# Patient Record
Sex: FEMALE | Race: BLACK OR AFRICAN AMERICAN | Employment: FULL TIME | URBAN - METROPOLITAN AREA
[De-identification: names, ages, dates, MRNs, and addresses within clinical notes are randomized per-mention and may not be internally consistent; named-entity substitution may affect disease eponyms.]

---

## 2018-09-04 ENCOUNTER — APPOINTMENT (OUTPATIENT)
Dept: GENERAL RADIOLOGY | Age: 48
End: 2018-09-04

## 2018-09-04 ENCOUNTER — HOSPITAL ENCOUNTER (EMERGENCY)
Age: 48
Discharge: HOME | End: 2018-09-04
Attending: EMERGENCY MEDICINE

## 2018-09-04 VITALS
DIASTOLIC BLOOD PRESSURE: 73 MMHG | OXYGEN SATURATION: 98 % | TEMPERATURE: 98.4 F | BODY MASS INDEX: 29.24 KG/M2 | SYSTOLIC BLOOD PRESSURE: 128 MMHG | HEIGHT: 62 IN | HEART RATE: 103 BPM | WEIGHT: 158.9 LBS | RESPIRATION RATE: 16 BRPM

## 2018-09-04 DIAGNOSIS — M79.602 LEFT ARM PAIN: Primary | ICD-10-CM

## 2018-09-04 PROCEDURE — 99284 EMERGENCY DEPT VISIT MOD MDM: CPT

## 2018-09-04 PROCEDURE — 73110 X-RAY EXAM OF WRIST: CPT

## 2018-09-04 PROCEDURE — 73130 X-RAY EXAM OF HAND: CPT

## 2018-09-04 PROCEDURE — 73060 X-RAY EXAM OF HUMERUS: CPT

## 2018-09-04 RX ORDER — IBUPROFEN 600 MG/1
600 TABLET ORAL EVERY 6 HOURS PRN
Qty: 30 TABLET | Refills: 0 | Status: SHIPPED | OUTPATIENT
Start: 2018-09-04 | End: 2019-04-09

## 2018-09-04 ASSESSMENT — ENCOUNTER SYMPTOMS
RHINORRHEA: 0
WHEEZING: 0
COUGH: 0
DIARRHEA: 0
CONSTIPATION: 0
SORE THROAT: 0
ABDOMINAL PAIN: 0
COLOR CHANGE: 0
VOMITING: 0
SINUS PRESSURE: 0
NAUSEA: 0
SHORTNESS OF BREATH: 0

## 2018-09-04 ASSESSMENT — PAIN DESCRIPTION - ORIENTATION: ORIENTATION: LEFT

## 2018-09-04 ASSESSMENT — PAIN DESCRIPTION - FREQUENCY: FREQUENCY: CONTINUOUS

## 2018-09-04 ASSESSMENT — PAIN SCALES - WONG BAKER: WONGBAKER_NUMERICALRESPONSE: 8

## 2018-09-04 ASSESSMENT — PAIN DESCRIPTION - DESCRIPTORS: DESCRIPTORS: ACHING;CONSTANT

## 2018-09-04 ASSESSMENT — PAIN DESCRIPTION - LOCATION: LOCATION: BACK;ARM;HAND

## 2018-09-04 ASSESSMENT — PAIN SCALES - GENERAL: PAINLEVEL_OUTOF10: 8
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
## 2018-09-04 NOTE — ED PROVIDER NOTES
The patient was seen and examined by me in conjunction with the mid-level provider. I agree with his/her assessment and treatment plan. X-rays are negative per radiologist.  She will be treated symptomatically.      Aliya Barajas MD  09/04/18 [id number]

## 2019-04-09 ENCOUNTER — HOSPITAL ENCOUNTER (INPATIENT)
Age: 49
LOS: 1 days | Discharge: HOME | DRG: 371 | End: 2019-04-10
Attending: EMERGENCY MEDICINE | Admitting: INTERNAL MEDICINE
Payer: COMMERCIAL

## 2019-04-09 ENCOUNTER — APPOINTMENT (OUTPATIENT)
Dept: CT IMAGING | Age: 49
DRG: 371 | End: 2019-04-09
Payer: COMMERCIAL

## 2019-04-09 DIAGNOSIS — N28.9 MILD RENAL INSUFFICIENCY: ICD-10-CM

## 2019-04-09 DIAGNOSIS — E86.0 DEHYDRATION: ICD-10-CM

## 2019-04-09 DIAGNOSIS — R11.2 NAUSEA AND VOMITING, INTRACTABILITY OF VOMITING NOT SPECIFIED, UNSPECIFIED VOMITING TYPE: ICD-10-CM

## 2019-04-09 DIAGNOSIS — R19.7 DIARRHEA, UNSPECIFIED TYPE: Primary | ICD-10-CM

## 2019-04-09 DIAGNOSIS — I95.9 HYPOTENSION, UNSPECIFIED HYPOTENSION TYPE: ICD-10-CM

## 2019-04-09 PROBLEM — I10 ESSENTIAL HYPERTENSION: Status: ACTIVE | Noted: 2019-04-09

## 2019-04-09 PROBLEM — A04.72 CLOSTRIDIUM DIFFICILE COLITIS: Status: ACTIVE | Noted: 2019-04-09

## 2019-04-09 PROBLEM — D57.3 SICKLE CELL TRAIT (HCC): Status: ACTIVE | Noted: 2019-04-09

## 2019-04-09 PROBLEM — R57.1 HYPOVOLEMIC SHOCK (HCC): Status: ACTIVE | Noted: 2019-04-09

## 2019-04-09 PROBLEM — K52.9 COLITIS: Status: ACTIVE | Noted: 2019-04-09

## 2019-04-09 PROBLEM — N30.00 ACUTE CYSTITIS WITHOUT HEMATURIA: Status: ACTIVE | Noted: 2019-04-09

## 2019-04-09 PROBLEM — A09 INFECTIOUS COLITIS: Status: ACTIVE | Noted: 2019-04-09

## 2019-04-09 PROBLEM — N17.9 AKI (ACUTE KIDNEY INJURY) (HCC): Status: ACTIVE | Noted: 2019-04-09

## 2019-04-09 LAB
-: ABNORMAL
ABSOLUTE EOS #: 0.08 K/UL (ref 0–0.4)
ABSOLUTE IMMATURE GRANULOCYTE: ABNORMAL K/UL (ref 0–0.3)
ABSOLUTE LYMPH #: 1.73 K/UL (ref 1–4.8)
ABSOLUTE MONO #: 0.68 K/UL (ref 0.2–0.8)
ALBUMIN SERPL-MCNC: 3.8 G/DL (ref 3.5–5.2)
ALBUMIN/GLOBULIN RATIO: ABNORMAL (ref 1–2.5)
ALP BLD-CCNC: 103 U/L (ref 35–104)
ALT SERPL-CCNC: 38 U/L (ref 5–33)
AMORPHOUS: ABNORMAL
AMYLASE: 77 U/L (ref 28–100)
ANION GAP SERPL CALCULATED.3IONS-SCNC: 12 MMOL/L (ref 9–17)
AST SERPL-CCNC: 36 U/L
BACTERIA: ABNORMAL
BASOPHILS # BLD: 0 %
BASOPHILS ABSOLUTE: 0 K/UL (ref 0–0.2)
BILIRUB SERPL-MCNC: 1.22 MG/DL (ref 0.3–1.2)
BILIRUBIN DIRECT: 0.42 MG/DL
BILIRUBIN URINE: NEGATIVE
BILIRUBIN, INDIRECT: 0.8 MG/DL (ref 0–1)
BUN BLDV-MCNC: 13 MG/DL (ref 6–20)
BUN/CREAT BLD: 10 (ref 9–20)
C DIFFICILE TOXINS, PCR: ABNORMAL
CALCIUM SERPL-MCNC: 9 MG/DL (ref 8.6–10.4)
CAMPYLOBACTER PCR: NORMAL
CASTS UA: ABNORMAL /LPF
CHLORIDE BLD-SCNC: 98 MMOL/L (ref 98–107)
CO2: 23 MMOL/L (ref 20–31)
COLOR: YELLOW
COMMENT UA: ABNORMAL
CREAT SERPL-MCNC: 1.25 MG/DL (ref 0.5–0.9)
CRYSTALS, UA: ABNORMAL /HPF
DATE, STOOL #1: NORMAL
DATE, STOOL #2: NORMAL
DATE, STOOL #3: NORMAL
DIFFERENTIAL TYPE: ABNORMAL
DIRECT EXAM: NORMAL
DIRECT EXAM: NORMAL
E COLI ENTEROTOXIGENIC PCR: NORMAL
EOSINOPHILS RELATIVE PERCENT: 1 % (ref 1–4)
EPITHELIAL CELLS UA: ABNORMAL /HPF (ref 0–5)
GFR AFRICAN AMERICAN: 55 ML/MIN
GFR NON-AFRICAN AMERICAN: 46 ML/MIN
GFR SERPL CREATININE-BSD FRML MDRD: ABNORMAL ML/MIN/{1.73_M2}
GFR SERPL CREATININE-BSD FRML MDRD: ABNORMAL ML/MIN/{1.73_M2}
GLOBULIN: ABNORMAL G/DL (ref 1.5–3.8)
GLUCOSE BLD-MCNC: 119 MG/DL (ref 70–99)
GLUCOSE URINE: NEGATIVE
HCG, PREGNANCY URINE (POC): NEGATIVE
HCT VFR BLD CALC: 34 % (ref 36–46)
HEMOCCULT SP1 STL QL: NEGATIVE
HEMOCCULT SP2 STL QL: NORMAL
HEMOCCULT SP3 STL QL: NORMAL
HEMOGLOBIN: 10.5 G/DL (ref 12–16)
IMMATURE GRANULOCYTES: ABNORMAL %
KETONES, URINE: NEGATIVE
LACTIC ACID: 0.8 MMOL/L (ref 0.5–2.2)
LEUKOCYTE ESTERASE, URINE: ABNORMAL
LIPASE: 14 U/L (ref 13–60)
LYMPHOCYTES # BLD: 23 % (ref 24–44)
Lab: NORMAL
Lab: NORMAL
MAGNESIUM: 1.7 MG/DL (ref 1.6–2.6)
MCH RBC QN AUTO: 19.2 PG (ref 26–34)
MCHC RBC AUTO-ENTMCNC: 31 G/DL (ref 31–37)
MCV RBC AUTO: 62 FL (ref 80–100)
MONOCYTES # BLD: 9 % (ref 1–7)
MORPHOLOGY: ABNORMAL
MUCUS: ABNORMAL
NITRITE, URINE: NEGATIVE
NRBC AUTOMATED: ABNORMAL PER 100 WBC
OTHER OBSERVATIONS UA: ABNORMAL
PDW BLD-RTO: 17.8 % (ref 11.5–14.5)
PH UA: 6 (ref 5–8)
PLATELET # BLD: 143 K/UL (ref 130–400)
PLATELET ESTIMATE: ABNORMAL
PLESIOMONAS SHIGELLOIDES PCR: NORMAL
PMV BLD AUTO: 9 FL (ref 6–12)
POTASSIUM SERPL-SCNC: 3.8 MMOL/L (ref 3.7–5.3)
PROTEIN UA: NEGATIVE
RBC # BLD: 5.48 M/UL (ref 4–5.2)
RBC # BLD: ABNORMAL 10*6/UL
RBC UA: ABNORMAL /HPF (ref 0–2)
RENAL EPITHELIAL, UA: ABNORMAL /HPF
SALMONELLA PCR: NORMAL
SEG NEUTROPHILS: 67 % (ref 36–66)
SEGMENTED NEUTROPHILS ABSOLUTE COUNT: 5.01 K/UL (ref 1.8–7.7)
SHIGATOXIN GENE PCR: NORMAL
SHIGELLA SP PCR: NORMAL
SODIUM BLD-SCNC: 133 MMOL/L (ref 135–144)
SPECIFIC GRAVITY UA: ABNORMAL (ref 1–1.03)
SPECIMEN DESCRIPTION: ABNORMAL
SPECIMEN DESCRIPTION: NORMAL
TIME, STOOL #1: 635
TIME, STOOL #2: NORMAL
TIME, STOOL #3: NORMAL
TOTAL PROTEIN: 7 G/DL (ref 6.4–8.3)
TRICHOMONAS: ABNORMAL
TURBIDITY: ABNORMAL
URINE HGB: ABNORMAL
UROBILINOGEN, URINE: NORMAL
VIBRIO PCR: NORMAL
WBC # BLD: 7.5 K/UL (ref 3.5–11)
WBC # BLD: ABNORMAL 10*3/UL
WBC UA: ABNORMAL /HPF (ref 0–5)
YEAST: ABNORMAL
YERSINIA ENTEROCOLITICA PCR: NORMAL

## 2019-04-09 PROCEDURE — 84703 CHORIONIC GONADOTROPIN ASSAY: CPT

## 2019-04-09 PROCEDURE — 80076 HEPATIC FUNCTION PANEL: CPT

## 2019-04-09 PROCEDURE — 81001 URINALYSIS AUTO W/SCOPE: CPT

## 2019-04-09 PROCEDURE — 6370000000 HC RX 637 (ALT 250 FOR IP): Performed by: INTERNAL MEDICINE

## 2019-04-09 PROCEDURE — 2500000003 HC RX 250 WO HCPCS: Performed by: INTERNAL MEDICINE

## 2019-04-09 PROCEDURE — 6370000000 HC RX 637 (ALT 250 FOR IP): Performed by: EMERGENCY MEDICINE

## 2019-04-09 PROCEDURE — 2580000003 HC RX 258: Performed by: EMERGENCY MEDICINE

## 2019-04-09 PROCEDURE — 82272 OCCULT BLD FECES 1-3 TESTS: CPT

## 2019-04-09 PROCEDURE — 83735 ASSAY OF MAGNESIUM: CPT

## 2019-04-09 PROCEDURE — 80307 DRUG TEST PRSMV CHEM ANLYZR: CPT

## 2019-04-09 PROCEDURE — 96361 HYDRATE IV INFUSION ADD-ON: CPT

## 2019-04-09 PROCEDURE — 6360000002 HC RX W HCPCS: Performed by: INTERNAL MEDICINE

## 2019-04-09 PROCEDURE — 6360000002 HC RX W HCPCS: Performed by: EMERGENCY MEDICINE

## 2019-04-09 PROCEDURE — 87040 BLOOD CULTURE FOR BACTERIA: CPT

## 2019-04-09 PROCEDURE — 87425 ROTAVIRUS AG IA: CPT

## 2019-04-09 PROCEDURE — 36415 COLL VENOUS BLD VENIPUNCTURE: CPT

## 2019-04-09 PROCEDURE — 87086 URINE CULTURE/COLONY COUNT: CPT

## 2019-04-09 PROCEDURE — 99223 1ST HOSP IP/OBS HIGH 75: CPT | Performed by: INTERNAL MEDICINE

## 2019-04-09 PROCEDURE — 85025 COMPLETE CBC W/AUTO DIFF WBC: CPT

## 2019-04-09 PROCEDURE — 6360000004 HC RX CONTRAST MEDICATION: Performed by: EMERGENCY MEDICINE

## 2019-04-09 PROCEDURE — 2060000000 HC ICU INTERMEDIATE R&B

## 2019-04-09 PROCEDURE — 83690 ASSAY OF LIPASE: CPT

## 2019-04-09 PROCEDURE — 87329 GIARDIA AG IA: CPT

## 2019-04-09 PROCEDURE — 2580000003 HC RX 258: Performed by: INTERNAL MEDICINE

## 2019-04-09 PROCEDURE — 87506 IADNA-DNA/RNA PROBE TQ 6-11: CPT

## 2019-04-09 PROCEDURE — C9113 INJ PANTOPRAZOLE SODIUM, VIA: HCPCS | Performed by: EMERGENCY MEDICINE

## 2019-04-09 PROCEDURE — 87493 C DIFF AMPLIFIED PROBE: CPT

## 2019-04-09 PROCEDURE — 82150 ASSAY OF AMYLASE: CPT

## 2019-04-09 PROCEDURE — 96375 TX/PRO/DX INJ NEW DRUG ADDON: CPT

## 2019-04-09 PROCEDURE — 80048 BASIC METABOLIC PNL TOTAL CA: CPT

## 2019-04-09 PROCEDURE — G0480 DRUG TEST DEF 1-7 CLASSES: HCPCS

## 2019-04-09 PROCEDURE — 83605 ASSAY OF LACTIC ACID: CPT

## 2019-04-09 PROCEDURE — 87077 CULTURE AEROBIC IDENTIFY: CPT

## 2019-04-09 PROCEDURE — 74177 CT ABD & PELVIS W/CONTRAST: CPT

## 2019-04-09 PROCEDURE — 87186 SC STD MICRODIL/AGAR DIL: CPT

## 2019-04-09 PROCEDURE — 96374 THER/PROPH/DIAG INJ IV PUSH: CPT

## 2019-04-09 PROCEDURE — 99285 EMERGENCY DEPT VISIT HI MDM: CPT

## 2019-04-09 RX ORDER — SODIUM CHLORIDE 9 MG/ML
INJECTION, SOLUTION INTRAVENOUS CONTINUOUS
Status: DISCONTINUED | OUTPATIENT
Start: 2019-04-09 | End: 2019-04-09

## 2019-04-09 RX ORDER — SODIUM CHLORIDE 0.9 % (FLUSH) 0.9 %
10 SYRINGE (ML) INJECTION PRN
Status: DISCONTINUED | OUTPATIENT
Start: 2019-04-09 | End: 2019-04-09 | Stop reason: SDUPTHER

## 2019-04-09 RX ORDER — ONDANSETRON 2 MG/ML
8 INJECTION INTRAMUSCULAR; INTRAVENOUS ONCE
Status: COMPLETED | OUTPATIENT
Start: 2019-04-09 | End: 2019-04-09

## 2019-04-09 RX ORDER — 0.9 % SODIUM CHLORIDE 0.9 %
10 VIAL (ML) INJECTION ONCE
Status: COMPLETED | OUTPATIENT
Start: 2019-04-09 | End: 2019-04-09

## 2019-04-09 RX ORDER — ACETAMINOPHEN 325 MG/1
650 TABLET ORAL EVERY 4 HOURS PRN
Status: DISCONTINUED | OUTPATIENT
Start: 2019-04-09 | End: 2019-04-10 | Stop reason: HOSPADM

## 2019-04-09 RX ORDER — PANTOPRAZOLE SODIUM 40 MG/10ML
40 INJECTION, POWDER, LYOPHILIZED, FOR SOLUTION INTRAVENOUS ONCE
Status: COMPLETED | OUTPATIENT
Start: 2019-04-09 | End: 2019-04-09

## 2019-04-09 RX ORDER — DICYCLOMINE HYDROCHLORIDE 10 MG/1
10 CAPSULE ORAL
Status: DISCONTINUED | OUTPATIENT
Start: 2019-04-09 | End: 2019-04-09

## 2019-04-09 RX ORDER — FENTANYL CITRATE 50 UG/ML
50 INJECTION, SOLUTION INTRAMUSCULAR; INTRAVENOUS ONCE
Status: COMPLETED | OUTPATIENT
Start: 2019-04-09 | End: 2019-04-09

## 2019-04-09 RX ORDER — 0.9 % SODIUM CHLORIDE 0.9 %
1000 INTRAVENOUS SOLUTION INTRAVENOUS ONCE
Status: COMPLETED | OUTPATIENT
Start: 2019-04-09 | End: 2019-04-09

## 2019-04-09 RX ORDER — 0.9 % SODIUM CHLORIDE 0.9 %
30 INTRAVENOUS SOLUTION INTRAVENOUS ONCE
Status: COMPLETED | OUTPATIENT
Start: 2019-04-09 | End: 2019-04-09

## 2019-04-09 RX ORDER — ZONISAMIDE 100 MG/1
200 CAPSULE ORAL NIGHTLY
Status: DISCONTINUED | OUTPATIENT
Start: 2019-04-09 | End: 2019-04-10 | Stop reason: HOSPADM

## 2019-04-09 RX ORDER — SODIUM CHLORIDE 9 MG/ML
INJECTION, SOLUTION INTRAVENOUS CONTINUOUS
Status: DISCONTINUED | OUTPATIENT
Start: 2019-04-09 | End: 2019-04-10

## 2019-04-09 RX ORDER — DICYCLOMINE HYDROCHLORIDE 10 MG/1
10 CAPSULE ORAL 4 TIMES DAILY
Status: DISCONTINUED | OUTPATIENT
Start: 2019-04-09 | End: 2019-04-10 | Stop reason: HOSPADM

## 2019-04-09 RX ORDER — FOLIC ACID 1 MG/1
1 TABLET ORAL DAILY
Status: DISCONTINUED | OUTPATIENT
Start: 2019-04-09 | End: 2019-04-10 | Stop reason: HOSPADM

## 2019-04-09 RX ORDER — SODIUM CHLORIDE 0.9 % (FLUSH) 0.9 %
10 SYRINGE (ML) INJECTION PRN
Status: DISCONTINUED | OUTPATIENT
Start: 2019-04-09 | End: 2019-04-10 | Stop reason: HOSPADM

## 2019-04-09 RX ORDER — TIZANIDINE 2 MG/1
2 TABLET ORAL EVERY 8 HOURS PRN
Status: DISCONTINUED | OUTPATIENT
Start: 2019-04-09 | End: 2019-04-10 | Stop reason: HOSPADM

## 2019-04-09 RX ORDER — ACETAMINOPHEN 500 MG
1000 TABLET ORAL ONCE
Status: COMPLETED | OUTPATIENT
Start: 2019-04-09 | End: 2019-04-09

## 2019-04-09 RX ORDER — LIDOCAINE 4 G/G
1 PATCH TOPICAL DAILY
Status: DISCONTINUED | OUTPATIENT
Start: 2019-04-10 | End: 2019-04-10

## 2019-04-09 RX ORDER — 0.9 % SODIUM CHLORIDE 0.9 %
80 INTRAVENOUS SOLUTION INTRAVENOUS ONCE
Status: COMPLETED | OUTPATIENT
Start: 2019-04-09 | End: 2019-04-09

## 2019-04-09 RX ORDER — CHLORZOXAZONE 750 MG/1
750 TABLET ORAL 3 TIMES DAILY
COMMUNITY

## 2019-04-09 RX ORDER — CALCIUM CARBONATE 200(500)MG
500 TABLET,CHEWABLE ORAL 3 TIMES DAILY PRN
Status: DISCONTINUED | OUTPATIENT
Start: 2019-04-09 | End: 2019-04-10 | Stop reason: HOSPADM

## 2019-04-09 RX ORDER — SODIUM CHLORIDE 0.9 % (FLUSH) 0.9 %
10 SYRINGE (ML) INJECTION EVERY 12 HOURS SCHEDULED
Status: DISCONTINUED | OUTPATIENT
Start: 2019-04-09 | End: 2019-04-10 | Stop reason: HOSPADM

## 2019-04-09 RX ORDER — OXYCODONE AND ACETAMINOPHEN 10; 325 MG/1; MG/1
1 TABLET ORAL EVERY 6 HOURS PRN
Status: DISCONTINUED | OUTPATIENT
Start: 2019-04-09 | End: 2019-04-10 | Stop reason: HOSPADM

## 2019-04-09 RX ORDER — ZONISAMIDE 100 MG/1
200 CAPSULE ORAL NIGHTLY
COMMUNITY
End: 2020-02-02 | Stop reason: ALTCHOICE

## 2019-04-09 RX ADMIN — Medication 10 ML: at 05:36

## 2019-04-09 RX ADMIN — IBUPROFEN 600 MG: 100 SUSPENSION ORAL at 06:16

## 2019-04-09 RX ADMIN — SODIUM CHLORIDE 80 ML: 9 INJECTION, SOLUTION INTRAVENOUS at 05:36

## 2019-04-09 RX ADMIN — ACETAMINOPHEN 1000 MG: 500 TABLET ORAL at 06:16

## 2019-04-09 RX ADMIN — METRONIDAZOLE 500 MG: 500 INJECTION, SOLUTION INTRAVENOUS at 22:32

## 2019-04-09 RX ADMIN — SODIUM CHLORIDE 2190 ML: 9 INJECTION, SOLUTION INTRAVENOUS at 05:55

## 2019-04-09 RX ADMIN — SODIUM CHLORIDE 1000 ML: 9 INJECTION, SOLUTION INTRAVENOUS at 07:49

## 2019-04-09 RX ADMIN — IOPAMIDOL 75 ML: 755 INJECTION, SOLUTION INTRAVENOUS at 05:33

## 2019-04-09 RX ADMIN — FOLIC ACID 1 MG: 1 TABLET ORAL at 16:17

## 2019-04-09 RX ADMIN — SODIUM CHLORIDE 1000 ML: 9 INJECTION, SOLUTION INTRAVENOUS at 09:13

## 2019-04-09 RX ADMIN — SODIUM CHLORIDE 150 ML/HR: 9 INJECTION, SOLUTION INTRAVENOUS at 11:33

## 2019-04-09 RX ADMIN — ACETAMINOPHEN 650 MG: 325 TABLET ORAL at 16:17

## 2019-04-09 RX ADMIN — ONDANSETRON 8 MG: 2 INJECTION INTRAMUSCULAR; INTRAVENOUS at 05:23

## 2019-04-09 RX ADMIN — Medication 125 MG: at 16:18

## 2019-04-09 RX ADMIN — CEFEPIME 2 G: 2 INJECTION, POWDER, FOR SOLUTION INTRAVENOUS at 11:30

## 2019-04-09 RX ADMIN — Medication 10 ML: at 05:24

## 2019-04-09 RX ADMIN — OXYCODONE AND ACETAMINOPHEN 1 TABLET: 10; 325 TABLET ORAL at 22:59

## 2019-04-09 RX ADMIN — METRONIDAZOLE 500 MG: 500 INJECTION, SOLUTION INTRAVENOUS at 12:27

## 2019-04-09 RX ADMIN — PANTOPRAZOLE SODIUM 40 MG: 40 INJECTION, POWDER, FOR SOLUTION INTRAVENOUS at 05:24

## 2019-04-09 RX ADMIN — ENOXAPARIN SODIUM 40 MG: 40 INJECTION SUBCUTANEOUS at 11:31

## 2019-04-09 RX ADMIN — FENTANYL CITRATE 50 MCG: 50 INJECTION, SOLUTION INTRAMUSCULAR; INTRAVENOUS at 05:23

## 2019-04-09 ASSESSMENT — ENCOUNTER SYMPTOMS
EYES NEGATIVE: 1
ABDOMINAL PAIN: 1
RESPIRATORY NEGATIVE: 1
NAUSEA: 1
VOMITING: 1
ABDOMINAL DISTENTION: 1
DIARRHEA: 1

## 2019-04-09 ASSESSMENT — PAIN SCALES - GENERAL
PAINLEVEL_OUTOF10: 8
PAINLEVEL_OUTOF10: 7
PAINLEVEL_OUTOF10: 7

## 2019-04-09 ASSESSMENT — PAIN DESCRIPTION - DESCRIPTORS: DESCRIPTORS: ACHING

## 2019-04-09 ASSESSMENT — PAIN DESCRIPTION - PAIN TYPE: TYPE: ACUTE PAIN

## 2019-04-09 ASSESSMENT — PAIN DESCRIPTION - FREQUENCY: FREQUENCY: CONTINUOUS

## 2019-04-09 ASSESSMENT — PAIN DESCRIPTION - PROGRESSION: CLINICAL_PROGRESSION: NOT CHANGED

## 2019-04-09 ASSESSMENT — PAIN DESCRIPTION - LOCATION: LOCATION: ABDOMEN;GENERALIZED

## 2019-04-09 NOTE — PROGRESS NOTES
Patient return to room 1024 from PACU. Telemetry placed and call light given/within reach. Patient A&O and given room orientation. Orders released/reviewed. Message sent to pharmacy to send new bicarb bag. See chart for detail.

## 2019-04-09 NOTE — PROGRESS NOTES
Patient admitted to room 1023. VS taken, telemetry placed and call light given/within reach. Patient A&O and given room orientation. Orders released/reviewed, initial assessment completed and navigator started. See chart for more detail.

## 2019-04-09 NOTE — PROGRESS NOTES
Transitions of Care Pharmacy Service   Medication Review    The patient's list of current home medications has been reviewed and updated. Source(s) of information: patient, Emmie, [employer]  She gets her pain meds from [employer], all other prescriptions are filled at [facility]    Please feel free to call with any questions about this encounter. Thank you.     Manjula Luna, [facility]  Transitions of Care Pharmacy Service  Phone:  [phone]  Fax: [phone]            Prior to Admission medications    Medication Sig       chlorzoxazone (PARAFON FORTE) 750 MG TABS tablet Take 750 mg by mouth 3 times daily        calcium-cholecalciferol (CALCIUM 500+D) 500-200 MG-UNIT per tablet Take 1 tablet by mouth daily       Lidocaine (ZTLIDO) 1.8 % PTCH Apply 1 patch topically daily To leg       zonisamide (ZONEGRAN) 100 MG capsule Take 200 mg by mouth nightly 2 caps (=200mg) nightly       progesterone (PROMETRIUM) 100 MG capsule Take 100 mg by mouth daily       estrogens, conjugated, (PREMARIN) 0.3 MG tablet Take 0.3 mg by mouth daily       Cyanocobalamin (VITAMIN B12 PO) Take 1 tablet by mouth daily       FOLIC ACID PO Take 1 tablet by mouth daily       lisinopril (PRINIVIL;ZESTRIL) 5 MG tablet Take 7.5 mg by mouth daily 1.5 tabs (=7.5mg) daily       oxyCODONE-acetaminophen (PERCOCET)  MG per tablet Take 1 tablet by mouth every 6 hours as needed for Pain.        loratadine (CLARITIN) 10 MG tablet Take 1 tablet by mouth daily as needed

## 2019-04-09 NOTE — ED NOTES
Into assess patient vitals continue to fluctuate running on the lower side of 19'V systolic to high 60'Y. Patient verbalizing \"feels weak\".      Juanita Engel RN  04/09/19 0036

## 2019-04-09 NOTE — ED PROVIDER NOTES
97/56 113/69   Pulse:   87 84   Resp:   16 14   Temp:   98.1 °F (36.7 °C) 98.4 °F (36.9 °C)   TempSrc:   Oral    SpO2: 96% 99% 100% 99%   Weight:       Height:           Physical exam reflects a hypotensive female who does appear uncomfortable. She is tachycardic. Low-grade temp noted. Integument warm and dry. She is alert conversive and appropriate in behavior. GCS 15. Oropharyngeal exam without lesion. Dry membranes noted. No cervical lymphadenopathy noted. Neck soft and supple no meningismus. Heart regular rate and rhythm normal S1 and S2 tachycardic lungs are clear to auscultation no wheezes rales rhonchi abdomen is distended minimal bowel sounds noted. She is diffusely tender. Extremities show no cyanosis clubbing or edema. Integument without rash or lesion. No neurovascular deficits are noted      DIAGNOSTIC RESULTS       RADIOLOGY:   Non-plain film images such as CT, Ultrasound and MRI are read by the radiologist. Plain radiographic images are visualized and preliminarily interpreted by the emergency physician with the below findings:    CT ABDOMEN PELVIS W IV CONTRAST   Status: Final result   Order Providers     Authorizing Billing   MD Joann Thorpe, DO          Signed by     Signed Date/Time  Phone Pager   [address] [place] [address] [phone] [address] 4/09/2019 06:16 [phone]    [facility]     Read Date Phone Pager   Gilmer Castillo Apr 9, 2019 [phone]    Radiation Dose Estimates     No radiation information found for this patient   Narrative   EXAMINATION:   CT OF THE ABDOMEN AND PELVIS WITH CONTRAST 4/9/2019 5:17 am       TECHNIQUE:   CT of the abdomen and pelvis was performed with the administration of   intravenous contrast. Multiplanar reformatted images are provided for review. Dose modulation, iterative reconstruction, and/or weight based adjustment of   the mA/kV was utilized to reduce the radiation dose to as low as reasonably   achievable.       COMPARISON:   None.     HISTORY:   ORDERING SYSTEM PROVIDED HISTORY: ABDOMINAL PAIN   TECHNOLOGIST PROVIDED HISTORY:   IV Only Contrast       FINDINGS:   Lower Chest: Trace bilateral pleural effusions.  Mild bibasilar subsegmental   atelectasis.  No cardiomegaly or pericardial effusion.       Liver: Normal.       Gallbladder and Bile Ducts: Normal.       Spleen: Normal.       Adrenal Glands: Normal.       Pancreas: Normal.       Genitourinary: Normal.       Bowel: Diffuse colonic wall thickening and inflammation is most pronounced in   the rectosigmoid colon.  There is also fluid in the descending and   rectosigmoid colon.  No bowel obstruction.  Normal appendix.       Vasculature: Normal.       Bones and Soft Tissues: No acute abnormality.       Retroperitoneum/Mesentery: No intraperitoneal free air, ascites or fluid   collection. No lymphadenopathy in the abdomen or pelvis.           Impression   Diffuse colonic wall thickening and inflammation is most pronounced in the   rectosigmoid colon.  There is also fluid in the descending and rectosigmoid   colon.  Findings suggest infectious versus inflammatory colitis.       No free air or abscess.       Trace bilateral pleural effusions.             Interpretation per the Radiologist below, if available at the time of this note:    CT ABDOMEN PELVIS W IV CONTRAST   Final Result   Diffuse colonic wall thickening and inflammation is most pronounced in the   rectosigmoid colon. There is also fluid in the descending and rectosigmoid   colon. Findings suggest infectious versus inflammatory colitis. No free air or abscess. Trace bilateral pleural effusions. LABS:  Labs Reviewed   C DIFF TOXIN B BY RT PCR - Abnormal; Notable for the following components:       Result Value    C Difficile tox, pcr   (*)     Value: POSITIVE: C difficile tcdB nucleic acid detected by RT-PCR.     All other components within normal limits   BASIC METABOLIC PANEL - Abnormal; Notable for the following components:    Glucose 119 (*)     CREATININE 1.25 (*)     Sodium 133 (*)     GFR Non- 46 (*)     GFR  55 (*)     All other components within normal limits   CBC WITH AUTO DIFFERENTIAL - Abnormal; Notable for the following components:    RBC 5.48 (*)     Hemoglobin 10.5 (*)     Hematocrit 34.0 (*)     MCV 62.0 (*)     MCH 19.2 (*)     RDW 17.8 (*)     Seg Neutrophils 67 (*)     Lymphocytes 23 (*)     Monocytes 9 (*)     All other components within normal limits   HEPATIC FUNCTION PANEL - Abnormal; Notable for the following components:    ALT 38 (*)     AST 36 (*)     Total Bilirubin 1.22 (*)     Bilirubin, Direct 0.42 (*)     All other components within normal limits   URINALYSIS - Abnormal; Notable for the following components:    Turbidity UA SLIGHTLY CLOUDY (*)     Urine Hgb 2+ (*)     Leukocyte Esterase, Urine TRACE (*)     All other components within normal limits   TOX SCR, BLD, ED - Abnormal; Notable for the following components:    Salicylate Lvl <1 (*)     Acetaminophen Level <10 (*)     All other components within normal limits   MICROSCOPIC URINALYSIS - Abnormal; Notable for the following components:    Bacteria, UA MANY (*)     All other components within normal limits   CULTURE BLOOD #1   CULTURE BLOOD #1   CULTURE STOOL   GIARDIA ANTIGEN   URINE CULTURE   AMYLASE   LIPASE   LACTIC ACID   MAGNESIUM   BLOOD OCCULT STOOL SCREEN #1   ROTAVIRUS ANTIGEN, STOOL   COMPREHENSIVE METABOLIC PANEL W/ REFLEX TO MG FOR LOW K   CBC   PROTIME-INR   CALCIUM, IONIZED   MAGNESIUM   PHOSPHORUS   POCT URINE PREGNANCY     Results for Shruti Fabian (MRN [id number]) as of 4/9/2019 06:16   Ref.  Range 4/9/2019 05:15 4/9/2019 05:24   Sodium Latest Ref Range: 135 - 144 mmol/L 133 (L)    Potassium Latest Ref Range: 3.7 - 5.3 mmol/L 3.8    Chloride Latest Ref Range: 98 - 107 mmol/L 98    CO2 Latest Ref Range: 20 - 31 mmol/L 23    BUN Latest Ref Range: 6 - 20 mg/dL 13    Creatinine Latest Ref Range: 0.50 - 0.90 mg/dL 1.25 (H)    Bun/Cre Ratio Latest Ref Range: 9 - 20  10    Anion Gap Latest Ref Range: 9 - 17 mmol/L 12    GFR Non- Latest Ref Range: >60 mL/min 46 (L)    GFR  Latest Ref Range: >60 mL/min 55 (L)    Magnesium Latest Ref Range: 1.6 - 2.6 mg/dL 1.7    Lactic Acid Latest Ref Range: 0.5 - 2.2 mmol/L 0.8    Glucose Latest Ref Range: 70 - 99 mg/dL 119 (H)    Calcium Latest Ref Range: 8.6 - 10.4 mg/dL 9.0    Albumin/Globulin Ratio Latest Ref Range: 1.0 - 2.5  NOT REPORTED    Total Protein Latest Ref Range: 6.4 - 8.3 g/dL 7.0    GFR Comment Unknown Pend    GFR Staging Unknown NOT REPORTED    Albumin Latest Ref Range: 3.5 - 5.2 g/dL 3.8    Globulin Latest Ref Range: 1.5 - 3.8 g/dL NOT REPORTED    Alk Phos Latest Ref Range: 35 - 104 U/L 103    ALT Latest Ref Range: 5 - 33 U/L 38 (H)    Amylase Latest Ref Range: 28 - 100 U/L 77    AST Latest Ref Range: <32 U/L 36 (H)    Bilirubin Latest Ref Range: 0.3 - 1.2 mg/dL 1.22 (H)    Bilirubin, Direct Latest Ref Range: <0.31 mg/dL 0.42 (H)    Bilirubin, Indirect Latest Ref Range: 0.00 - 1.00 mg/dL 0.80    Lipase Latest Ref Range: 13 - 60 U/L 14    Toxic Tricyclic Sc,Blood Latest Ref Range: NEGATIVE  PENDING    Ethanol Latest Ref Range: <10 mg/dL <10    Ethanol percent Latest Ref Range: <0.010 % <0.010    Acetaminophen Level Latest Ref Range: 10 - 30 ug/mL <53 (L)    Salicylate Lvl Latest Ref Range: 3 - 10 mg/dL <1 (L)    WBC Latest Ref Range: 3.5 - 11.0 k/uL 7.5    RBC Latest Ref Range: 4.0 - 5.2 m/uL 5.48 (H)    Hemoglobin Quant Latest Ref Range: 12.0 - 16.0 g/dL 10.5 (L)    Hematocrit Latest Ref Range: 36 - 46 % 34.0 (L)    MCV Latest Ref Range: 80 - 100 fL 62.0 (L)    MCH Latest Ref Range: 26 - 34 pg 19.2 (L)    MCHC Latest Ref Range: 31 - 37 g/dL 31.0    MPV Latest Ref Range: 6.0 - 12.0 fL 9.0    RDW Latest Ref Range: 11.5 - 14.5 % 17.8 (H)    Platelet Count Latest Ref Range: 130 - 400 k/uL 143    Platelet Estimate Unknown NOT REPORTED      All other labs were within normal range or not returned as of this dictation. EMERGENCY DEPARTMENT COURSE and DIFFERENTIAL DIAGNOSIS/MDM:   Vitals:    Vitals:    04/09/19 1127 04/09/19 1258 04/09/19 1431 04/09/19 1447   BP: (!) 100/53 122/69 (!) 97/56 113/69   Pulse:   87 84   Resp:   16 14   Temp:   98.1 °F (36.7 °C) 98.4 °F (36.9 °C)   TempSrc:   Oral    SpO2: 96% 99% 100% 99%   Weight:       Height:         Patient is evaluated. IV access established. She is treated symptomatically with IV fluids and medications for discomfort. Laboratory and imaging studies are requested. Care is turned over to Dr. Adriana Lawson at shift change for review of investigations, additional care and ultimate disposition    CONSULTS:  IP CONSULT TO INTERNAL MEDICINE  IP CONSULT TO INFECTIOUS DISEASES    PROCEDURES:  None    FINAL IMPRESSION      1. Diarrhea, unspecified type    2. Nausea and vomiting, intractability of vomiting not specified, unspecified vomiting type    3. Dehydration    4. Mild renal insufficiency    5. Hypotension, unspecified hypotension type          DISPOSITION/PLAN   DISPOSITION    Disposition per Dr Himanshu Conway:   Marc Glover MD  [address] [place] [address]  [phone]            DISCHARGE MEDICATIONS:     Current Discharge Medication List        Controlled Substances Monitoring:     RX Monitoring 4/9/2019   Attestation The Prescription Monitoring Report for this patient was reviewed today. Chronic Pain Routine Monitoring Obtaining appropriate analgesic effect of treatment.        (Please note that portions of this note were completed with a voice recognition program.  Efforts were made to edit the dictations but occasionally words are mis-transcribed.)    Kinga Fountain MD  Attending Emergency Physician         Kinga Fountain MD  04/09/19 6546

## 2019-04-09 NOTE — H&P
[facility]    HISTORY AND PHYSICAL EXAMINATION            Date:   2019  Patient name:  Hever Kidd  Date of admission:  2019  5:01 AM  MRN:   [id number]  Account:  [de-identified]  YOB: 1970  PCP:    Yahaira Oh MD  Room:   [facility]174-  Code Status:    Full Code    Chief Complaint:     Chief Complaint   Patient presents with    Diarrhea     onset saturday    Sweats    Other     fluctuating BP    Abdominal Pain       History Obtained From:     patient, electronic medical record    History of Present Illness: The patient is a 50 y.o. female with PMH significant for HTN, sickle cell trait who presented with headache and diarrhea. Patient states she has been having diarrhea for the past 4 days along with headache for the past day. States she took BP at home and noted it to be low in 90's causing her to come to ER. States she is on BP meds at home but only takes medications intermittently. In ER patient noted to be hypotensive in 80s, given bolus x 4. Labs demonstrating creatinine 1.25, lactic acid normal. CT abdomen demonstrating colitis. Admitted for further workup. Past Medical History:     Past Medical History:   Diagnosis Date    H/O sickle cell trait     History of blood transfusion     Hypertension         Past Surgical History:     Past Surgical History:   Procedure Laterality Date     SECTION  7452,8219    X2    COLONOSCOPY      DILATION AND CURETTAGE OF UTERUS      ECTOPIC PREGNANCY SURGERY      KNEE ARTHROSCOPY Left     KNEE ARTHROSCOPY Left 07/01/15    UPPER GASTROINTESTINAL ENDOSCOPY          Medications Prior to Admission:     Prior to Admission medications    Medication Sig Start Date End Date Taking?  Authorizing Provider   chlorzoxazone (PARAFON FORTE) 750 MG TABS tablet Take 750 mg by mouth 3 times daily    Yes Historical Provider, MD   calcium-cholecalciferol (CALCIUM U/L   Basic Metabolic Panel    Collection Time: 04/09/19  5:15 AM   Result Value Ref Range    Glucose 119 (H) 70 - 99 mg/dL    BUN 13 6 - 20 mg/dL    CREATININE 1.25 (H) 0.50 - 0.90 mg/dL    Bun/Cre Ratio 10 9 - 20    Calcium 9.0 8.6 - 10.4 mg/dL    Sodium 133 (L) 135 - 144 mmol/L    Potassium 3.8 3.7 - 5.3 mmol/L    Chloride 98 98 - 107 mmol/L    CO2 23 20 - 31 mmol/L    Anion Gap 12 9 - 17 mmol/L    GFR Non-African American 46 (L) >60 mL/min    GFR  55 (L) >60 mL/min    GFR Comment          GFR Staging NOT REPORTED    CBC Auto Differential    Collection Time: 04/09/19  5:15 AM   Result Value Ref Range    WBC 7.5 3.5 - 11.0 k/uL    RBC 5.48 (H) 4.0 - 5.2 m/uL    Hemoglobin 10.5 (L) 12.0 - 16.0 g/dL    Hematocrit 34.0 (L) 36 - 46 %    MCV 62.0 (L) 80 - 100 fL    MCH 19.2 (L) 26 - 34 pg    MCHC 31.0 31 - 37 g/dL    RDW 17.8 (H) 11.5 - 14.5 %    Platelets 464 528 - 038 k/uL    MPV 9.0 6.0 - 12.0 fL    NRBC Automated NOT REPORTED per 100 WBC    Differential Type NOT REPORTED     Immature Granulocytes NOT REPORTED 0 %    Absolute Immature Granulocyte NOT REPORTED 0.00 - 0.30 k/uL    WBC Morphology NOT REPORTED     RBC Morphology NOT REPORTED     Platelet Estimate NOT REPORTED     Seg Neutrophils 67 (H) 36 - 66 %    Lymphocytes 23 (L) 24 - 44 %    Monocytes 9 (H) 1 - 7 %    Eosinophils % 1 1 - 4 %    Basophils 0 %    Segs Absolute 5.01 1.8 - 7.7 k/uL    Absolute Lymph # 1.73 1.0 - 4.8 k/uL    Absolute Mono # 0.68 0.2 - 0.8 k/uL    Absolute Eos # 0.08 0.0 - 0.4 k/uL    Basophils # 0.00 0.0 - 0.2 k/uL    Morphology ANISOCYTOSIS PRESENT     Morphology HYPOCHROMIA PRESENT     Morphology MICROCYTOSIS PRESENT     Morphology 1+ POLYCHROMASIA     Morphology BASOPHILIC STIPPLING PRESENT    Hepatic Function Panel    Collection Time: 04/09/19  5:15 AM   Result Value Ref Range    Alb 3.8 3.5 - 5.2 g/dL    Alkaline Phosphatase 103 35 - 104 U/L    ALT 38 (H) 5 - 33 U/L    AST 36 (H) <32 U/L    Total Bilirubin 1.22 (H) 0.3 - 1.2 mg/dL    Bilirubin, Direct 0.42 (H) <0.31 mg/dL    Bilirubin, Indirect 0.80 0.00 - 1.00 mg/dL    Total Protein 7.0 6.4 - 8.3 g/dL    Globulin NOT REPORTED 1.5 - 3.8 g/dL    Albumin/Globulin Ratio NOT REPORTED 1.0 - 2.5   Lipase    Collection Time: 04/09/19  5:15 AM   Result Value Ref Range    Lipase 14 13 - 60 U/L   Lactic Acid    Collection Time: 04/09/19  5:15 AM   Result Value Ref Range    Lactic Acid 0.8 0.5 - 2.2 mmol/L   Magnesium    Collection Time: 04/09/19  5:15 AM   Result Value Ref Range    Magnesium 1.7 1.6 - 2.6 mg/dL   TOX SCR, BLD, ED    Collection Time: 04/09/19  5:15 AM   Result Value Ref Range    Ethanol <10 <10 mg/dL    Ethanol percent <3.333 <2.099 %    Salicylate Lvl <1 (L) 3 - 10 mg/dL    Acetaminophen Level <10 (L) 10 - 30 ug/mL    Toxic Tricyclic Sc,Blood PENDING NEGATIVE   Culture Blood #1    Collection Time: 04/09/19  5:24 AM   Result Value Ref Range    Specimen Description . BLOOD     Special Requests 12ML LT AC     Culture NO GROWTH 4 HOURS    Culture Blood #1    Collection Time: 04/09/19  5:51 AM   Result Value Ref Range    Specimen Description . BLOOD     Special Requests 12ML RT AC     Culture NO GROWTH 4 HOURS    Urinalysis    Collection Time: 04/09/19  6:30 AM   Result Value Ref Range    Color, UA YELLOW YELLOW    Turbidity UA SLIGHTLY CLOUDY (A) CLEAR    Glucose, Ur NEGATIVE NEGATIVE    Bilirubin Urine NEGATIVE NEGATIVE    Ketones, Urine NEGATIVE NEGATIVE    Specific Lizella, UA UNABLE TO PERFORM D/T INTERFERENCE FROM CONTRAST 1.005 - 1.030    Urine Hgb 2+ (A) NEGATIVE    pH, UA 6.0 5.0 - 8.0    Protein, UA NEGATIVE NEGATIVE    Urobilinogen, Urine Normal Normal    Nitrite, Urine NEGATIVE NEGATIVE    Leukocyte Esterase, Urine TRACE (A) NEGATIVE    Urinalysis Comments NOT REPORTED    Microscopic Urinalysis    Collection Time: 04/09/19  6:30 AM   Result Value Ref Range    -          WBC, UA 10 TO 20 0 - 5 /HPF    RBC, UA 5 TO 10 0 - 2 /HPF    Casts UA NOT REPORTED /LPF Crystals UA NOT REPORTED None /HPF    Epithelial Cells UA 20 TO 50 0 - 5 /HPF    Renal Epithelial, Urine NOT REPORTED 0 /HPF    Bacteria, UA MANY (A) None    Mucus, UA NOT REPORTED None    Trichomonas, UA NOT REPORTED None    Amorphous, UA NOT REPORTED None    Other Observations UA NOT REPORTED NOT REQ. Yeast, UA NOT REPORTED None   Blood Occult Stool Screen #1    Collection Time: 04/09/19  6:35 AM   Result Value Ref Range    Occult Blood, Stool #1 NEGATIVE NEGATIVE    Date, Stool #1 5,130,444     Time, Stool #1 635     Occult Blood, Stool #2 NOT REPORTED NEGATIVE    Date, Stool #2 NOT REPORTED     Time, Stool #2 NOT REPORTED     Occult Blood, Stool #3 NOT REPORTED NEGATIVE    Date, Stool #3 NOT REPORTED     Time, Stool #3 NOT REPORTED    Rotavirus antigen, stool    Collection Time: 04/09/19  6:35 AM   Result Value Ref Range    Specimen Description . FECES     Special Requests NOT REPORTED     Direct Exam Negative for Rotavirus    Giardia antigen    Collection Time: 04/09/19  6:35 AM   Result Value Ref Range    Specimen Description . FECES     Special Requests NOT REPORTED     Direct Exam Giardia Antigen Assay Negative    POCT urine pregnancy    Collection Time: 04/09/19  6:36 AM   Result Value Ref Range    HCG, Pregnancy Urine (POC) NEGATIVE NEGATIVE       Imaging/Diagnostics:    Ct Abdomen Pelvis W Iv Contrast    Result Date: 4/9/2019  EXAMINATION: CT OF THE ABDOMEN AND PELVIS WITH CONTRAST 4/9/2019 5:17 am TECHNIQUE: CT of the abdomen and pelvis was performed with the administration of intravenous contrast. Multiplanar reformatted images are provided for review. Dose modulation, iterative reconstruction, and/or weight based adjustment of the mA/kV was utilized to reduce the radiation dose to as low as reasonably achievable. COMPARISON: None. HISTORY: ORDERING SYSTEM PROVIDED HISTORY: ABDOMINAL PAIN TECHNOLOGIST PROVIDED HISTORY: IV Only Contrast FINDINGS: Lower Chest: Trace bilateral pleural effusions.   Mild outlined, requirement for current medical therapies and most importantly because of direct risk to patient if care not provided in a hospital setting.     Pablo Carranza MD  4/9/2019  2:41 PM    Copy sent to Dr. Kilo Valdez MD

## 2019-04-09 NOTE — ED PROVIDER NOTES
ADDENDUM:        Care of this patient was assumed from Dr. Leslee Villatoro   at    0700  . The patient was seen for Diarrhea (onset saturday); Sweats; Other (fluctuating BP); and Abdominal Pain  . The patient's initial evaluation and plan have been discussed with the prior provider who initially evaluated the patient. Nursing Notes, Past Medical Hx, Past Surgical Hx, Social Hx, Allergies, and Family Hx were all reviewed. I performed a repeat evaluation of the patient and reviewed tests completed so far. ED Course       The patient was endorsed to me by Dr. Leslee Villatoro pending reevaluation. Patient presented to the emergency department secondary to nausea vomiting and diarrhea; labs obtained overnight patient received IV fluids. However despite 3 L patient's blood pressure remained systolic 20O. An additional IV was placed and orders for additional fluid bolus. Given this discussed with patient admission for hydration and further ancillary testing. Discussed with Dr. Leon Funez with the internal medicine service who agreed to admit the patient further evaluation treatment.     The patient was given the following medications:  Orders Placed This Encounter   Medications    0.9 % sodium chloride bolus    DISCONTD: 0.9 % sodium chloride infusion    ondansetron (ZOFRAN) injection 8 mg    AND Linked Order Group     pantoprazole (PROTONIX) injection 40 mg     sodium chloride (PF) 0.9 % injection 10 mL    fentaNYL (SUBLIMAZE) injection 50 mcg    0.9 % sodium chloride bolus    iopamidol (ISOVUE-370) 76 % injection 75 mL    sodium chloride flush 0.9 % injection 10 mL    ibuprofen (ADVIL;MOTRIN) 100 MG/5ML suspension 600 mg    acetaminophen (TYLENOL) tablet 1,000 mg    0.9 % sodium chloride bolus    0.9 % sodium chloride bolus       RECENT VITALS:  BP: 96/60, Temp: 99.7 °F (37.6 °C), Pulse: 94, Resp: 18     RADIOLOGY:All plain film, CT, MRI, and formal ultrasound images (except ED bedside ultrasound) are read by the radiologist and the images and interpretations are directly viewed by the emergency physician. CT ABDOMEN PELVIS W IV CONTRAST   Final Result   Diffuse colonic wall thickening and inflammation is most pronounced in the   rectosigmoid colon. There is also fluid in the descending and rectosigmoid   colon. Findings suggest infectious versus inflammatory colitis. No free air or abscess. Trace bilateral pleural effusions. LABS: All lab results were reviewed by myself, and all abnormals are listed below.   Labs Reviewed   BASIC METABOLIC PANEL - Abnormal; Notable for the following components:       Result Value    Glucose 119 (*)     CREATININE 1.25 (*)     Sodium 133 (*)     GFR Non- 46 (*)     GFR  55 (*)     All other components within normal limits   CBC WITH AUTO DIFFERENTIAL - Abnormal; Notable for the following components:    RBC 5.48 (*)     Hemoglobin 10.5 (*)     Hematocrit 34.0 (*)     MCV 62.0 (*)     MCH 19.2 (*)     RDW 17.8 (*)     Seg Neutrophils 67 (*)     Lymphocytes 23 (*)     Monocytes 9 (*)     All other components within normal limits   HEPATIC FUNCTION PANEL - Abnormal; Notable for the following components:    ALT 38 (*)     AST 36 (*)     Total Bilirubin 1.22 (*)     Bilirubin, Direct 0.42 (*)     All other components within normal limits   URINALYSIS - Abnormal; Notable for the following components:    Turbidity UA SLIGHTLY CLOUDY (*)     Urine Hgb 2+ (*)     Leukocyte Esterase, Urine TRACE (*)     All other components within normal limits   TOX SCR, BLD, ED - Abnormal; Notable for the following components:    Salicylate Lvl <1 (*)     Acetaminophen Level <10 (*)     All other components within normal limits   MICROSCOPIC URINALYSIS - Abnormal; Notable for the following components:    Bacteria, UA MANY (*)     All other components within normal limits   CULTURE BLOOD #1   CULTURE BLOOD #1   URINE CULTURE   CULTURE STOOL   C DIFF TOXIN B BY RT PCR   GIARDIA ANTIGEN   AMYLASE   LIPASE   LACTIC ACID   MAGNESIUM   BLOOD OCCULT STOOL SCREEN #1   ROTAVIRUS ANTIGEN, STOOL   POCT URINE PREGNANCY           Disposition   DISPOSITION:    DISPOSITION Decision To Admit 04/09/2019 07:54:24 AM      CLINICAL IMPRESSION:  1. Diarrhea, unspecified type    2. Nausea and vomiting, intractability of vomiting not specified, unspecified vomiting type    3. Dehydration    4. Mild renal insufficiency    5. Hypotension, unspecified hypotension type        PATIENT REFERRED TO:  No follow-up provider specified.     DISCHARGE MEDICATIONS:  New Prescriptions    No medications on file       Lambert Jones MD  Attending Emergency Physician              Lambert Jones MD  [id number] [phone]

## 2019-04-10 VITALS
TEMPERATURE: 98.1 F | BODY MASS INDEX: 29.81 KG/M2 | WEIGHT: 162 LBS | DIASTOLIC BLOOD PRESSURE: 75 MMHG | HEIGHT: 62 IN | OXYGEN SATURATION: 100 % | SYSTOLIC BLOOD PRESSURE: 146 MMHG | RESPIRATION RATE: 18 BRPM | HEART RATE: 80 BPM

## 2019-04-10 LAB
ACETAMINOPHEN LEVEL: <10 UG/ML (ref 10–30)
ALBUMIN SERPL-MCNC: 3.2 G/DL (ref 3.5–5.2)
ALBUMIN/GLOBULIN RATIO: ABNORMAL (ref 1–2.5)
ALP BLD-CCNC: 79 U/L (ref 35–104)
ALT SERPL-CCNC: 36 U/L (ref 5–33)
ANION GAP SERPL CALCULATED.3IONS-SCNC: 6 MMOL/L (ref 9–17)
AST SERPL-CCNC: 37 U/L
BILIRUB SERPL-MCNC: 0.63 MG/DL (ref 0.3–1.2)
BUN BLDV-MCNC: 9 MG/DL (ref 6–20)
BUN/CREAT BLD: 15 (ref 9–20)
CALCIUM IONIZED: 1.23 MMOL/L (ref 1.13–1.33)
CALCIUM SERPL-MCNC: 8.2 MG/DL (ref 8.6–10.4)
CHLORIDE BLD-SCNC: 110 MMOL/L (ref 98–107)
CO2: 22 MMOL/L (ref 20–31)
CREAT SERPL-MCNC: 0.61 MG/DL (ref 0.5–0.9)
CULTURE: ABNORMAL
ETHANOL PERCENT: <0.01 %
ETHANOL: <10 MG/DL
GFR AFRICAN AMERICAN: >60 ML/MIN
GFR NON-AFRICAN AMERICAN: >60 ML/MIN
GFR SERPL CREATININE-BSD FRML MDRD: ABNORMAL ML/MIN/{1.73_M2}
GFR SERPL CREATININE-BSD FRML MDRD: ABNORMAL ML/MIN/{1.73_M2}
GLUCOSE BLD-MCNC: 102 MG/DL (ref 70–99)
HCT VFR BLD CALC: 27.3 % (ref 36–46)
HEMOGLOBIN: 8.5 G/DL (ref 12–16)
INR BLD: 1.1
Lab: ABNORMAL
MAGNESIUM: 1.7 MG/DL (ref 1.6–2.6)
MCH RBC QN AUTO: 19.4 PG (ref 26–34)
MCHC RBC AUTO-ENTMCNC: 31.3 G/DL (ref 31–37)
MCV RBC AUTO: 62 FL (ref 80–100)
NRBC AUTOMATED: ABNORMAL PER 100 WBC
PDW BLD-RTO: 18.4 % (ref 11.5–14.5)
PHOSPHORUS: 2.6 MG/DL (ref 2.6–4.5)
PLATELET # BLD: 111 K/UL (ref 130–400)
PMV BLD AUTO: 9.1 FL (ref 6–12)
POTASSIUM SERPL-SCNC: 4.4 MMOL/L (ref 3.7–5.3)
PROTHROMBIN TIME: 11.3 SEC (ref 9.7–11.6)
RBC # BLD: 4.4 M/UL (ref 4–5.2)
SALICYLATE LEVEL: <1 MG/DL (ref 3–10)
SODIUM BLD-SCNC: 138 MMOL/L (ref 135–144)
SPECIMEN DESCRIPTION: ABNORMAL
TOTAL PROTEIN: 5.9 G/DL (ref 6.4–8.3)
TOXIC TRICYCLIC SC,BLOOD: NEGATIVE
WBC # BLD: 3.7 K/UL (ref 3.5–11)

## 2019-04-10 PROCEDURE — 6370000000 HC RX 637 (ALT 250 FOR IP): Performed by: INTERNAL MEDICINE

## 2019-04-10 PROCEDURE — 80053 COMPREHEN METABOLIC PANEL: CPT

## 2019-04-10 PROCEDURE — 36415 COLL VENOUS BLD VENIPUNCTURE: CPT

## 2019-04-10 PROCEDURE — 85610 PROTHROMBIN TIME: CPT

## 2019-04-10 PROCEDURE — 85027 COMPLETE CBC AUTOMATED: CPT

## 2019-04-10 PROCEDURE — 82330 ASSAY OF CALCIUM: CPT

## 2019-04-10 PROCEDURE — 84100 ASSAY OF PHOSPHORUS: CPT

## 2019-04-10 PROCEDURE — 2500000003 HC RX 250 WO HCPCS: Performed by: INTERNAL MEDICINE

## 2019-04-10 PROCEDURE — 6360000002 HC RX W HCPCS: Performed by: NURSE PRACTITIONER

## 2019-04-10 PROCEDURE — 2580000003 HC RX 258: Performed by: INTERNAL MEDICINE

## 2019-04-10 PROCEDURE — 83735 ASSAY OF MAGNESIUM: CPT

## 2019-04-10 PROCEDURE — 6370000000 HC RX 637 (ALT 250 FOR IP): Performed by: NURSE PRACTITIONER

## 2019-04-10 PROCEDURE — 99239 HOSP IP/OBS DSCHRG MGMT >30: CPT | Performed by: NURSE PRACTITIONER

## 2019-04-10 PROCEDURE — 6360000002 HC RX W HCPCS: Performed by: INTERNAL MEDICINE

## 2019-04-10 RX ORDER — DICYCLOMINE HYDROCHLORIDE 10 MG/1
10 CAPSULE ORAL 4 TIMES DAILY
Qty: 120 CAPSULE | Refills: 3 | Status: SHIPPED | OUTPATIENT
Start: 2019-04-10 | End: 2019-04-10 | Stop reason: HOSPADM

## 2019-04-10 RX ORDER — DIPHENHYDRAMINE HYDROCHLORIDE 50 MG/ML
25 INJECTION INTRAMUSCULAR; INTRAVENOUS EVERY 6 HOURS
Status: DISCONTINUED | OUTPATIENT
Start: 2019-04-10 | End: 2019-04-10 | Stop reason: HOSPADM

## 2019-04-10 RX ORDER — SULFAMETHOXAZOLE AND TRIMETHOPRIM 800; 160 MG/1; MG/1
1 TABLET ORAL 2 TIMES DAILY
Qty: 14 TABLET | Refills: 0 | Status: SHIPPED | OUTPATIENT
Start: 2019-04-10 | End: 2019-04-17

## 2019-04-10 RX ORDER — LIDOCAINE 4 G/G
2 PATCH TOPICAL DAILY
Status: DISCONTINUED | OUTPATIENT
Start: 2019-04-10 | End: 2019-04-10 | Stop reason: HOSPADM

## 2019-04-10 RX ADMIN — Medication 10 ML: at 08:24

## 2019-04-10 RX ADMIN — OXYCODONE AND ACETAMINOPHEN 1 TABLET: 10; 325 TABLET ORAL at 11:00

## 2019-04-10 RX ADMIN — METRONIDAZOLE 500 MG: 500 INJECTION, SOLUTION INTRAVENOUS at 06:41

## 2019-04-10 RX ADMIN — DIPHENHYDRAMINE HYDROCHLORIDE 25 MG: 50 INJECTION, SOLUTION INTRAMUSCULAR; INTRAVENOUS at 03:14

## 2019-04-10 RX ADMIN — DIPHENHYDRAMINE HYDROCHLORIDE 25 MG: 50 INJECTION, SOLUTION INTRAMUSCULAR; INTRAVENOUS at 08:16

## 2019-04-10 RX ADMIN — Medication 125 MG: at 02:18

## 2019-04-10 RX ADMIN — CEFEPIME 2 G: 2 INJECTION, POWDER, FOR SOLUTION INTRAVENOUS at 00:31

## 2019-04-10 RX ADMIN — SODIUM CHLORIDE: 9 INJECTION, SOLUTION INTRAVENOUS at 03:14

## 2019-04-10 RX ADMIN — Medication 125 MG: at 06:41

## 2019-04-10 ASSESSMENT — PAIN SCALES - GENERAL
PAINLEVEL_OUTOF10: 0
PAINLEVEL_OUTOF10: 7
PAINLEVEL_OUTOF10: 5

## 2019-04-10 ASSESSMENT — PAIN DESCRIPTION - PAIN TYPE: TYPE: ACUTE PAIN

## 2019-04-10 ASSESSMENT — PAIN DESCRIPTION - LOCATION: LOCATION: LEG

## 2019-04-10 ASSESSMENT — PAIN DESCRIPTION - FREQUENCY: FREQUENCY: INTERMITTENT

## 2019-04-10 ASSESSMENT — PAIN DESCRIPTION - DESCRIPTORS: DESCRIPTORS: CRAMPING;SPASM

## 2019-04-10 NOTE — PROGRESS NOTES
Call to patient pharmacy. No prior-auth needed for vancomycin, patient will have a $12 co-pay. Pt updated.

## 2019-04-10 NOTE — CARE COORDINATION
Case Management Initial Discharge Plan  Matthew Cameron,         Readmission Risk              Risk of Unplanned Readmission:        0             Met with:patient to discuss discharge plans. Information verified: address, contacts, phone number, , insurance Yes  PCP: Kilo Valdez MD  Date of last visit: 3 weeks ago     Insurance Provider: Yvonne Griffith 150     Discharge Planning  Current Residence:  Private home   Living Arrangements:  Spouse/Significant Other       Home has 2 stories/1 flight of  stairs to climb to get to her bed and bath   Support Systems:  Spouse/Significant Other       Current Services PTA:  None   Agency: none       Patient able to perform ADL's:Independent  DME in home:  [facility]  DME used to aid ambulation prior to admission:   None   DME used during admission:  None     Potential Assistance Needed:  N/A    [facility]    Potential Assistance Purchasing Medications:  No  Does patient want to participate in local refill/ meds to beds program?  No    Patient agreeable to home care: No  [place] of choice provided:  n/a      Type of Home Care Services:  None  Patient expects to be discharged to:  HOME    Prior SNF/Rehab Placement and Facility: none   Agreeable to SNF/Rehab: No  [place] of choice provided: n/a   Evaluation: n/a    Expected Discharge date:  04/10/19  Follow Up Appointment: Best Day/ Time: Wednesday AM    Transportation provider: per family   Transportation arrangements needed for discharge: No    Discharge Plan:   Patient lives with Carly Willams in 2 story home. She is very independent and does not use any DME>     Patient admitted with C DIFF and will need to verify if skinny raza needs a PA>     Winsome NEELY called patient pharmacy and no PA needed and cost is 12 dollar co pay.      No other needs      Electronically signed by Pillo Haskins RN on 4/10/19 at 12:15 PM

## 2019-04-10 NOTE — PROGRESS NOTES
Physical Therapy  DATE: 4/10/2019    NAME: Kendra Perez  MRN: [id number]   : 1970    Patient not seen this date for Physical Therapy due to:  [] Blood transfusion in progress  [] Hemodialysis  []  Patient Declined  [] Spine Precautions   [] Strict Bedrest  [] Surgery/ Procedure  [] Testing      [x] Other -- per RN, patient IND and safe with all mobility and does not need PT services        [x] PT being discontinued at this time. Patient independent. No further needs. [] PT being discontinued at this time as the patient has been transferred to palliative care. No further needs.     [place], PT

## 2019-04-10 NOTE — DISCHARGE SUMMARY
MCHC 31.3 04/10/2019    RDW 18.4 04/10/2019     04/10/2019     BMP:    Lab Results   Component Value Date    GLUCOSE 102 04/10/2019     04/10/2019    K 4.4 04/10/2019     04/10/2019    CO2 22 04/10/2019    ANIONGAP 6 04/10/2019    BUN 9 04/10/2019    CREATININE 0.61 04/10/2019    BUNCRER 15 04/10/2019    CALCIUM 8.2 04/10/2019    LABGLOM >60 04/10/2019    GFRAA >60 04/10/2019    GFR      04/10/2019    GFR NOT REPORTED 04/10/2019     U/A:    Lab Results   Component Value Date    COLORU YELLOW 04/09/2019    TURBIDITY SLIGHTLY CLOUDY 04/09/2019    SPECGRAV UNABLE TO PERFORM D/T INTERFERENCE FROM CONTRAST 04/09/2019    HGBUR 2+ 04/09/2019    PHUR 6.0 04/09/2019    PROTEINU NEGATIVE 04/09/2019    GLUCOSEU NEGATIVE 04/09/2019    KETUA NEGATIVE 04/09/2019    BILIRUBINUR NEGATIVE 04/09/2019    UROBILINOGEN Normal 04/09/2019    NITRU NEGATIVE 04/09/2019    LEUKOCYTESUR TRACE 04/09/2019       Radiology:  Ct Abdomen Pelvis W Iv Contrast    Result Date: 4/9/2019  EXAMINATION: CT OF THE ABDOMEN AND PELVIS WITH CONTRAST 4/9/2019 5:17 am TECHNIQUE: CT of the abdomen and pelvis was performed with the administration of intravenous contrast. Multiplanar reformatted images are provided for review. Dose modulation, iterative reconstruction, and/or weight based adjustment of the mA/kV was utilized to reduce the radiation dose to as low as reasonably achievable. COMPARISON: None. HISTORY: ORDERING SYSTEM PROVIDED HISTORY: ABDOMINAL PAIN TECHNOLOGIST PROVIDED HISTORY: IV Only Contrast FINDINGS: Lower Chest: Trace bilateral pleural effusions. Mild bibasilar subsegmental atelectasis. No cardiomegaly or pericardial effusion. Liver: Normal. Gallbladder and Bile Ducts: Normal. Spleen: Normal. Adrenal Glands: Normal. Pancreas: Normal. Genitourinary: Normal. Bowel: Diffuse colonic wall thickening and inflammation is most pronounced in the rectosigmoid colon. There is also fluid in the descending and rectosigmoid colon.   No bowel obstruction. Normal appendix. Vasculature: Normal. Bones and Soft Tissues: No acute abnormality. Retroperitoneum/Mesentery: No intraperitoneal free air, ascites or fluid collection. No lymphadenopathy in the abdomen or pelvis. Diffuse colonic wall thickening and inflammation is most pronounced in the rectosigmoid colon. There is also fluid in the descending and rectosigmoid colon. Findings suggest infectious versus inflammatory colitis. No free air or abscess. Trace bilateral pleural effusions. Consultations:    Consults:     Final Specialist Recommendations/Findings:   IP CONSULT TO INTERNAL MEDICINE      The patient was seen and examined on day of discharge and this discharge summary is in conjunction with any daily progress note from day of discharge. Discharge plan:     Disposition: Home    Physician Follow Up:   Zohaib Haskins MD  [address] [place] [address]. [address] [place] [address]  [phone]    In 2 weeks         Requiring Further Evaluation/Follow Up POST HOSPITALIZATION/Incidental Findings: follow up with PCP regarding further monitoring HGB/Sickle cell trait    Diet: regular diet    Activity: As tolerated    Instructions to Patient: Take all medications as prescribed. Take full antibiotic course until completion    Discharge Medications:      Medication List      START taking these medications    sulfamethoxazole-trimethoprim 800-160 MG per tablet  Commonly known as:  BACTRIM DS;SEPTRA DS  Take 1 tablet by mouth 2 times daily for 7 days     vancomycin 50 MG/ML Solr oral solution  Commonly known as:  FIRVANQ  Take 2.5 mLs by mouth 4 times daily for 10 days        CHANGE how you take these medications    VITAMIN B12 PO  What changed:  Another medication with the same name was removed. Continue taking this medication, and follow the directions you see here.         CONTINUE taking these medications    CALCIUM 500+D 500-200 MG-UNIT per tablet  Generic drug:  calcium-cholecalciferol chlorzoxazone 750 MG Tabs tablet  Commonly known as:  PARAFON FORTE     estrogens (conjugated) 0.3 MG tablet  Commonly known as:  PREMARIN     FOLIC ACID PO     lisinopril 5 MG tablet  Commonly known as:  PRINIVIL;ZESTRIL     loratadine 10 MG tablet  Commonly known as:  CLARITIN     oxyCODONE-acetaminophen  MG per tablet  Commonly known as:  PERCOCET     PROMETRIUM 100 MG capsule  Generic drug:  progesterone     zonisamide 100 MG capsule  Commonly known as:  ZONEGRAN     ZTLIDO 1.8 % Ptch  Generic drug:  Lidocaine        STOP taking these medications    cyclobenzaprine 10 MG tablet  Commonly known as:  FLEXERIL           Where to Get Your Medications      These medications were sent to [facility] [place] [id number]    Phone:  [phone]   · sulfamethoxazole-trimethoprim 800-160 MG per tablet  · vancomycin 50 MG/ML Solr oral solution         Time Spent on discharge is  36 mins in patient examination, evaluation, counseling as well as medication reconciliation, prescriptions for required medications, discharge plan and follow up. Electronically signed by   CHRISTOPHER Becerra NP  4/10/2019  9:53 AM      Thank you Dr. Baudilio Davenport MD for the opportunity to be involved in this patient's care.

## 2019-04-10 NOTE — PROGRESS NOTES
[facility]    Progress Note    4/10/2019    8:55 AM    Name:   Sharmaine Peguero  MRN:     [id number]     Maryide:      [de-identified]   Room:   [address] [place] [address] Day:  1  Admit Date:  4/9/2019  5:01 AM    PCP:   Izabel Jensen MD  Code Status:  Full Code    Subjective:     C/C:   Chief Complaint   Patient presents with    Diarrhea     onset saturday    Sweats    Other     fluctuating BP    Abdominal Pain     Interval History Status: improved. Feeling better today, still having some diarrhea but less frequent bowel movements. She is eating and drinking ok. Denies N/V, afebrile    Brief History:   Per my partner   \"The patient is a 50 y.o. female with PMH significant for HTN, sickle cell trait who presented with headache and diarrhea. Patient states she has been having diarrhea for the past 4 days along with headache for the past day. States she took BP at home and noted it to be low in 90's causing her to come to ER. States she is on BP meds at home but only takes medications intermittently. In ER patient noted to be hypotensive in 80s, given bolus x 4. Labs demonstrating creatinine 1.25, lactic acid normal. CT abdomen demonstrating colitis. Admitted for further workup. \"    Review of Systems:     Constitutional:  negative for chills, fevers, sweats  Respiratory:  negative for cough, dyspnea on exertion, shortness of breath, wheezing  Cardiovascular:  negative for chest pain, chest pressure/discomfort, lower extremity edema, palpitations  Gastrointestinal:  positive for abdominal pain, diarrhea. negative for constipation, nausea, vomiting  Neurological:  negative for dizziness, headache    Medications: Allergies:     Allergies   Allergen Reactions    Coconut Fragrance     Pcn [Penicillins]        Current Meds:   Scheduled Meds:    lidocaine  2 patch Transdermal Daily    diphenhydrAMINE  25 mg Intravenous Q6H    sodium chloride flush  10 mL Intravenous 2 times per day    enoxaparin  40 mg Subcutaneous Daily    cefepime  2 g Intravenous Q12H    metroNIDAZOLE  500 mg Intravenous Q8H    dicyclomine  10 mg Oral 4x Daily    vancomycin  125 mg Oral 4 times per day    folic acid  1 mg Oral Daily    zonisamide  200 mg Oral Nightly     Continuous Infusions:    sodium chloride 150 mL/hr at 04/10/19 0314     PRN Meds: sodium chloride flush, acetaminophen, calcium carbonate, oxyCODONE-acetaminophen, tiZANidine    Data:     Past Medical History:   has a past medical history of H/O sickle cell trait, History of blood transfusion, and Hypertension. Social History:   reports that she has never smoked. She has never used smokeless tobacco. She reports that she drinks alcohol. She reports that she does not use drugs. Family History:   Family History   Problem Relation Age of Onset    Diabetes Father        Vitals:  BP (!) 146/75   Pulse 80   Temp 98.1 °F (36.7 °C) (Oral)   Resp 18   Ht 5' 2\" (1.575 m)   Wt 162 lb (73.5 kg)   LMP 2019 (Approximate)   SpO2 100%   BMI 29.63 kg/m²   Temp (24hrs), Av.1 °F (36.7 °C), Min:97.7 °F (36.5 °C), Max:98.4 °F (36.9 °C)    No results for input(s): POCGLU in the last 72 hours. I/O (24Hr):     Intake/Output Summary (Last 24 hours) at 4/10/2019 0855  Last data filed at 4/10/2019 0645  Gross per 24 hour   Intake 1874 ml   Output --   Net 1874 ml       Labs:    Hematology:  Recent Labs     1915 04/10/19  0531   WBC 7.5 3.7   RBC 5.48* 4.40   HGB 10.5* 8.5*   HCT 34.0* 27.3*   MCV 62.0* 62.0*   MCH 19.2* 19.4*   MCHC 31.0 31.3   RDW 17.8* 18.4*    111*   MPV 9.0 9.1   INR  --  1.1     Chemistry:  Recent Labs     1915 04/10/19  0531   * 138   K 3.8 4.4   CL 98 110*   CO2 23 22   GLUCOSE 119* 102*   BUN 13 9   CREATININE 1.25* 0.61   MG 1.7 1.7   ANIONGAP 12 6*   LABGLOM 46* >60   GFRAA 55* >60   CALCIUM 9.0 8.2*   CAION  --  1.23   PHOS  --  2.6     Recent Labs 04/09/19  0515 04/10/19  0531   PROT 7.0 5.9*   LABALBU 3.8 3.2*   AST 36* 37*   ALT 38* 36*   ALKPHOS 103 79   BILITOT 1.22* 0.63   BILIDIR 0.42*  --    AMYLASE 77  --    LIPASE 14  --          Lab Results   Component Value Date/Time    SPECIAL NOT REPORTED 04/09/2019 06:35 AM    SPECIAL NOT REPORTED 04/09/2019 06:35 AM     Lab Results   Component Value Date/Time    CULTURE (A) 04/09/2019 06:30 AM     LACTOSE FERMENTING GRAM NEGATIVE RODS  >[id number] CFU/ML         No results found for: POCPH, PHART, PH, POCPCO2, SOH7FPB, PCO2, POCPO2, PO2ART, PO2, POCHCO3, WLY9LVZ, HCO3, NBEA, PBEA, BEART, BE, THGBART, THB, SWY3KPX, YBXD9AMY, E2SAYYUR, O2SAT, FIO2    Radiology:  No new imaging    Physical Examination:        General appearance:  alert, cooperative and no distress  Mental Status:  oriented to person, place and time and normal affect  Lungs:  clear to auscultation bilaterally, normal effort  Heart:  regular rate and rhythm, no murmur  Abdomen:  soft, nontender, nondistended, normal bowel sounds, no masses, hepatomegaly, splenomegaly  Extremities:  no edema, redness, tenderness in the calves  Skin:  no gross lesions, rashes, induration    Assessment:        Primary Problem  Clostridium difficile colitis    Active Hospital Problems    Diagnosis Date Noted    Clostridium difficile colitis [A04.72] 04/09/2019    Hypovolemic shock ([facility] [address] [R57.1] 04/09/2019    Acute cystitis without hematuria [N30.00] 04/09/2019    Essential hypertension [I10] 04/09/2019    SANTANA (acute kidney injury) ([facility] [address] [N17.9] 04/09/2019    Sickle cell trait ([facility] [address] [D57.3] 04/09/2019       Plan:        1. Continue Oral Vanco  2. Switch IV antibiotics to oral  3. Discontinue IV fluids  4. Continue DVT prophylaxis  5. Discharge today after breakfast if stable  6.  Plan discussed with patient and RN    CHRISTOPHER Chong NP  4/10/2019  8:55 AM

## 2019-04-15 LAB
CULTURE: NORMAL
CULTURE: NORMAL
Lab: NORMAL
Lab: NORMAL
SPECIMEN DESCRIPTION: NORMAL
SPECIMEN DESCRIPTION: NORMAL

## 2020-02-02 ENCOUNTER — HOSPITAL ENCOUNTER (EMERGENCY)
Age: 50
Discharge: HOME | End: 2020-02-03
Attending: EMERGENCY MEDICINE
Payer: COMMERCIAL

## 2020-02-02 ENCOUNTER — APPOINTMENT (OUTPATIENT)
Dept: CT IMAGING | Age: 50
End: 2020-02-02
Payer: COMMERCIAL

## 2020-02-02 VITALS
TEMPERATURE: 97.8 F | WEIGHT: 174.8 LBS | OXYGEN SATURATION: 99 % | HEART RATE: 80 BPM | SYSTOLIC BLOOD PRESSURE: 122 MMHG | BODY MASS INDEX: 32.17 KG/M2 | HEIGHT: 62 IN | DIASTOLIC BLOOD PRESSURE: 54 MMHG | RESPIRATION RATE: 17 BRPM

## 2020-02-02 LAB
ALBUMIN SERPL-MCNC: 4.3 G/DL (ref 3.5–5.2)
ALBUMIN/GLOBULIN RATIO: NORMAL (ref 1–2.5)
ALP BLD-CCNC: 102 U/L (ref 35–104)
ALT SERPL-CCNC: 29 U/L (ref 5–33)
ANION GAP SERPL CALCULATED.3IONS-SCNC: 12 MMOL/L (ref 9–17)
AST SERPL-CCNC: 29 U/L
BILIRUB SERPL-MCNC: 0.8 MG/DL (ref 0.3–1.2)
BILIRUBIN DIRECT: 0.21 MG/DL
BILIRUBIN, INDIRECT: 0.59 MG/DL (ref 0–1)
BUN BLDV-MCNC: 10 MG/DL (ref 6–20)
BUN/CREAT BLD: 13 (ref 9–20)
CALCIUM SERPL-MCNC: 9.3 MG/DL (ref 8.6–10.4)
CHLORIDE BLD-SCNC: 106 MMOL/L (ref 98–107)
CO2: 21 MMOL/L (ref 20–31)
CREAT SERPL-MCNC: 0.8 MG/DL (ref 0.5–0.9)
GFR AFRICAN AMERICAN: >60 ML/MIN
GFR NON-AFRICAN AMERICAN: >60 ML/MIN
GFR SERPL CREATININE-BSD FRML MDRD: ABNORMAL ML/MIN/{1.73_M2}
GFR SERPL CREATININE-BSD FRML MDRD: ABNORMAL ML/MIN/{1.73_M2}
GLOBULIN: NORMAL G/DL (ref 1.5–3.8)
GLUCOSE BLD-MCNC: 103 MG/DL (ref 70–99)
LIPASE: 28 U/L (ref 13–60)
POTASSIUM SERPL-SCNC: 3.6 MMOL/L (ref 3.7–5.3)
SODIUM BLD-SCNC: 139 MMOL/L (ref 135–144)
TOTAL PROTEIN: 7.4 G/DL (ref 6.4–8.3)

## 2020-02-02 PROCEDURE — 6360000004 HC RX CONTRAST MEDICATION: Performed by: EMERGENCY MEDICINE

## 2020-02-02 PROCEDURE — 81001 URINALYSIS AUTO W/SCOPE: CPT

## 2020-02-02 PROCEDURE — 96374 THER/PROPH/DIAG INJ IV PUSH: CPT

## 2020-02-02 PROCEDURE — 6370000000 HC RX 637 (ALT 250 FOR IP): Performed by: EMERGENCY MEDICINE

## 2020-02-02 PROCEDURE — 6360000002 HC RX W HCPCS: Performed by: EMERGENCY MEDICINE

## 2020-02-02 PROCEDURE — 80076 HEPATIC FUNCTION PANEL: CPT

## 2020-02-02 PROCEDURE — 2580000003 HC RX 258: Performed by: EMERGENCY MEDICINE

## 2020-02-02 PROCEDURE — 74177 CT ABD & PELVIS W/CONTRAST: CPT

## 2020-02-02 PROCEDURE — 99284 EMERGENCY DEPT VISIT MOD MDM: CPT

## 2020-02-02 PROCEDURE — 80048 BASIC METABOLIC PNL TOTAL CA: CPT

## 2020-02-02 PROCEDURE — 83690 ASSAY OF LIPASE: CPT

## 2020-02-02 PROCEDURE — 85025 COMPLETE CBC W/AUTO DIFF WBC: CPT

## 2020-02-02 RX ORDER — 0.9 % SODIUM CHLORIDE 0.9 %
1000 INTRAVENOUS SOLUTION INTRAVENOUS ONCE
Status: COMPLETED | OUTPATIENT
Start: 2020-02-02 | End: 2020-02-03

## 2020-02-02 RX ORDER — SODIUM CHLORIDE 0.9 % (FLUSH) 0.9 %
10 SYRINGE (ML) INJECTION PRN
Status: DISCONTINUED | OUTPATIENT
Start: 2020-02-02 | End: 2020-02-03 | Stop reason: HOSPADM

## 2020-02-02 RX ORDER — ONDANSETRON 2 MG/ML
4 INJECTION INTRAMUSCULAR; INTRAVENOUS ONCE
Status: COMPLETED | OUTPATIENT
Start: 2020-02-02 | End: 2020-02-02

## 2020-02-02 RX ORDER — 0.9 % SODIUM CHLORIDE 0.9 %
80 INTRAVENOUS SOLUTION INTRAVENOUS ONCE
Status: COMPLETED | OUTPATIENT
Start: 2020-02-02 | End: 2020-02-02

## 2020-02-02 RX ADMIN — SODIUM CHLORIDE 1000 ML: 9 INJECTION, SOLUTION INTRAVENOUS at 23:12

## 2020-02-02 RX ADMIN — ONDANSETRON 4 MG: 2 INJECTION INTRAMUSCULAR; INTRAVENOUS at 23:12

## 2020-02-02 RX ADMIN — IOPAMIDOL 80 ML: 755 INJECTION, SOLUTION INTRAVENOUS at 23:46

## 2020-02-02 RX ADMIN — LIDOCAINE HYDROCHLORIDE: 20 SOLUTION ORAL; TOPICAL at 23:12

## 2020-02-02 RX ADMIN — Medication 10 ML: at 23:46

## 2020-02-02 RX ADMIN — SODIUM CHLORIDE 80 ML: 9 INJECTION, SOLUTION INTRAVENOUS at 23:46

## 2020-02-02 ASSESSMENT — ENCOUNTER SYMPTOMS
COUGH: 0
ABDOMINAL PAIN: 1
EYE REDNESS: 0
EYE DISCHARGE: 0
RHINORRHEA: 0
DIARRHEA: 0
COLOR CHANGE: 0
SHORTNESS OF BREATH: 0
NAUSEA: 0
SORE THROAT: 0
VOMITING: 0

## 2020-02-02 ASSESSMENT — PAIN SCALES - GENERAL: PAINLEVEL_OUTOF10: 8

## 2020-02-02 ASSESSMENT — PAIN DESCRIPTION - FREQUENCY: FREQUENCY: CONTINUOUS

## 2020-02-02 ASSESSMENT — PAIN DESCRIPTION - PAIN TYPE: TYPE: ACUTE PAIN

## 2020-02-02 ASSESSMENT — PAIN DESCRIPTION - LOCATION: LOCATION: ABDOMEN

## 2020-02-02 ASSESSMENT — PAIN DESCRIPTION - DESCRIPTORS: DESCRIPTORS: BURNING

## 2020-02-03 LAB
-: ABNORMAL
ABSOLUTE EOS #: 0.12 K/UL (ref 0–0.44)
ABSOLUTE IMMATURE GRANULOCYTE: 0 K/UL (ref 0–0.3)
ABSOLUTE LYMPH #: 1.4 K/UL (ref 1.1–3.7)
ABSOLUTE MONO #: 0.4 K/UL (ref 0.1–1.2)
AMORPHOUS: ABNORMAL
BACTERIA: ABNORMAL
BASOPHILS # BLD: 0 % (ref 0–2)
BASOPHILS ABSOLUTE: 0 K/UL (ref 0–0.2)
BILIRUBIN URINE: NEGATIVE
CASTS UA: ABNORMAL /LPF
COLOR: YELLOW
COMMENT UA: ABNORMAL
CRYSTALS, UA: ABNORMAL /HPF
DIFFERENTIAL TYPE: ABNORMAL
EOSINOPHILS RELATIVE PERCENT: 3 % (ref 1–4)
EPITHELIAL CELLS UA: ABNORMAL /HPF (ref 0–5)
GLUCOSE URINE: NEGATIVE
HCT VFR BLD CALC: 36 % (ref 36.3–47.1)
HEMOGLOBIN: 10.2 G/DL (ref 11.9–15.1)
IMMATURE GRANULOCYTES: 0 %
KETONES, URINE: NEGATIVE
LEUKOCYTE ESTERASE, URINE: ABNORMAL
LYMPHOCYTES # BLD: 35 % (ref 24–43)
MCH RBC QN AUTO: 18.4 PG (ref 25.2–33.5)
MCHC RBC AUTO-ENTMCNC: 28.3 G/DL (ref 28.4–34.8)
MCV RBC AUTO: 65 FL (ref 82.6–102.9)
MONOCYTES # BLD: 10 % (ref 3–12)
MORPHOLOGY: ABNORMAL
MUCUS: ABNORMAL
NITRITE, URINE: NEGATIVE
NRBC AUTOMATED: 0 PER 100 WBC
OTHER OBSERVATIONS UA: ABNORMAL
PDW BLD-RTO: 16.8 % (ref 11.8–14.4)
PH UA: 5.5 (ref 5–8)
PLATELET # BLD: 168 K/UL (ref 138–453)
PLATELET ESTIMATE: ABNORMAL
PMV BLD AUTO: 10.8 FL (ref 8.1–13.5)
PROTEIN UA: NEGATIVE
RBC # BLD: 5.54 M/UL (ref 3.95–5.11)
RBC # BLD: ABNORMAL 10*6/UL
RBC UA: ABNORMAL /HPF (ref 0–2)
RENAL EPITHELIAL, UA: ABNORMAL /HPF
SEG NEUTROPHILS: 52 % (ref 36–65)
SEGMENTED NEUTROPHILS ABSOLUTE COUNT: 2.08 K/UL (ref 1.5–8.1)
SPECIFIC GRAVITY UA: 1.02 (ref 1–1.03)
TRICHOMONAS: ABNORMAL
TURBIDITY: ABNORMAL
URINE HGB: NEGATIVE
UROBILINOGEN, URINE: NORMAL
WBC # BLD: 4 K/UL (ref 3.5–11.3)
WBC # BLD: ABNORMAL 10*3/UL
WBC UA: ABNORMAL /HPF (ref 0–5)
YEAST: ABNORMAL

## 2020-02-03 RX ORDER — OMEPRAZOLE 20 MG/1
20 CAPSULE, DELAYED RELEASE ORAL 2 TIMES DAILY
Qty: 60 CAPSULE | Refills: 0 | Status: SHIPPED | OUTPATIENT
Start: 2020-02-03

## 2020-02-03 NOTE — ED NOTES
Patient presents to ED with c/o abdominal pain that has been ongoing for weeks. Describes pain as burning in nature. Patient AOx3 and appears to be in no acute distress. HR normal rate. Respirations even and non-labored. Abdomen tender in monty-umbilical area. Will continue to monitor.       Merlinda Snipes, RN  02/02/20 [id number]

## 2020-02-03 NOTE — ED PROVIDER NOTES
CALCIUM-CHOLECALCIFEROL (CALCIUM 500+D) 500-200 MG-UNIT PER TABLET    Take 1 tablet by mouth daily    CHLORZOXAZONE (PARAFON FORTE) 750 MG TABS TABLET    Take 750 mg by mouth 3 times daily     CYANOCOBALAMIN (VITAMIN B12 PO)    Take 1 tablet by mouth daily    ESTROGENS, CONJUGATED, (PREMARIN) 0.3 MG TABLET    Take 0.3 mg by mouth daily    FOLIC ACID PO    Take 1 tablet by mouth daily    LIDOCAINE (ZTLIDO) 1.8 % PTCH    Apply 1 patch topically daily To leg    LISINOPRIL (PRINIVIL;ZESTRIL) 5 MG TABLET    Take 7.5 mg by mouth daily 1.5 tabs (=7.5mg) daily    LORATADINE (CLARITIN) 10 MG TABLET    Take 1 tablet by mouth daily as needed     OXYCODONE-ACETAMINOPHEN (PERCOCET)  MG PER TABLET    Take 1 tablet by mouth every 6 hours as needed for Pain. PROGESTERONE (PROMETRIUM) 100 MG CAPSULE    Take 100 mg by mouth daily     ALLERGIES     is allergic to coconut fragrance and pcn [penicillins]. FAMILY HISTORY     She indicated that her mother is alive. She indicated that her father is . SOCIAL HISTORY       Social History     Tobacco Use    Smoking status: Never Smoker    Smokeless tobacco: Never Used   Substance Use Topics    Alcohol use: Yes     Comment: SOCIAL    Drug use: No     PHYSICAL EXAM     INITIAL VITALS: BP (!) 122/54   Pulse 80   Temp 97.8 °F (36.6 °C) (Oral)   Resp 17   Ht 5' 2\" (1.575 m)   Wt 174 lb 12.8 oz (79.3 kg)   SpO2 99%   BMI 31.97 kg/m²    Physical Exam  Constitutional:       Appearance: Normal appearance. She is well-developed. She is not ill-appearing or toxic-appearing. HENT:      Head: Normocephalic and atraumatic. Eyes:      Conjunctiva/sclera: Conjunctivae normal.      Pupils: Pupils are equal, round, and reactive to light. Neck:      Musculoskeletal: Normal range of motion and neck supple. Trachea: Trachea normal.   Cardiovascular:      Rate and Rhythm: Normal rate and regular rhythm. Heart sounds: S1 normal and S2 normal. No murmur.    Pulmonary:

## 2022-12-25 ENCOUNTER — HOSPITAL ENCOUNTER (EMERGENCY)
Age: 52
Discharge: NURSING, REHAB OR LONG-TERM CARE | End: 2022-12-26
Attending: EMERGENCY MEDICINE
Payer: COMMERCIAL

## 2022-12-25 DIAGNOSIS — T14.91XA SUICIDE ATTEMPT (HCC): Primary | ICD-10-CM

## 2022-12-25 LAB
ABSOLUTE EOS #: 0.22 K/UL (ref 0–0.4)
ABSOLUTE IMMATURE GRANULOCYTE: 0.05 K/UL (ref 0–0.3)
ABSOLUTE LYMPH #: 1.57 K/UL (ref 1–4.8)
ABSOLUTE MONO #: 0.32 K/UL (ref 0.1–0.8)
ACETAMINOPHEN LEVEL: <5 UG/ML (ref 10–30)
ALBUMIN SERPL-MCNC: 3.6 G/DL (ref 3.5–5.2)
ALBUMIN/GLOBULIN RATIO: 1.4 (ref 1–2.5)
ALP BLD-CCNC: 96 U/L (ref 35–104)
ALT SERPL-CCNC: 41 U/L (ref 5–33)
AMPHETAMINE SCREEN URINE: NEGATIVE
ANION GAP SERPL CALCULATED.3IONS-SCNC: 10 MMOL/L (ref 9–17)
AST SERPL-CCNC: 29 U/L
BARBITURATE SCREEN URINE: NEGATIVE
BASOPHILS # BLD: 0 % (ref 0–2)
BASOPHILS ABSOLUTE: 0 K/UL (ref 0–0.2)
BENZODIAZEPINE SCREEN, URINE: POSITIVE
BILIRUB SERPL-MCNC: 0.4 MG/DL (ref 0.3–1.2)
BUN BLDV-MCNC: 9 MG/DL (ref 6–20)
CALCIUM SERPL-MCNC: 8.5 MG/DL (ref 8.6–10.4)
CANNABINOID SCREEN URINE: NEGATIVE
CHLORIDE BLD-SCNC: 104 MMOL/L (ref 98–107)
CO2: 23 MMOL/L (ref 20–31)
COCAINE METABOLITE, URINE: NEGATIVE
CREAT SERPL-MCNC: 0.69 MG/DL (ref 0.5–0.9)
EOSINOPHILS RELATIVE PERCENT: 4 % (ref 1–4)
ETHANOL PERCENT: <0.01 %
ETHANOL: <10 MG/DL
FENTANYL URINE: NEGATIVE
GFR SERPL CREATININE-BSD FRML MDRD: >60 ML/MIN/1.73M2
GLUCOSE BLD-MCNC: 146 MG/DL (ref 70–99)
HCG QUALITATIVE: NEGATIVE
HCT VFR BLD CALC: 31.7 % (ref 36.3–47.1)
HEMOGLOBIN: 9.1 G/DL (ref 11.9–15.1)
IMMATURE GRANULOCYTES: 1 %
LYMPHOCYTES # BLD: 29 % (ref 24–44)
MCH RBC QN AUTO: 18.6 PG (ref 25.2–33.5)
MCHC RBC AUTO-ENTMCNC: 28.7 G/DL (ref 28.4–34.8)
MCV RBC AUTO: 65 FL (ref 82.6–102.9)
METHADONE SCREEN, URINE: NEGATIVE
MONOCYTES # BLD: 6 % (ref 1–7)
MORPHOLOGY: ABNORMAL
NRBC AUTOMATED: 0 PER 100 WBC
OPIATES, URINE: NEGATIVE
OXYCODONE SCREEN URINE: POSITIVE
PDW BLD-RTO: 17.2 % (ref 11.8–14.4)
PHENCYCLIDINE, URINE: NEGATIVE
PLATELET # BLD: ABNORMAL K/UL (ref 138–453)
PLATELET, FLUORESCENCE: 162 K/UL (ref 138–453)
PLATELET, IMMATURE FRACTION: 4.4 % (ref 1.1–10.3)
POTASSIUM SERPL-SCNC: 3.7 MMOL/L (ref 3.7–5.3)
RBC # BLD: 4.88 M/UL (ref 3.95–5.11)
SALICYLATE LEVEL: <1 MG/DL (ref 3–10)
SARS-COV-2, RAPID: NOT DETECTED
SEG NEUTROPHILS: 60 % (ref 36–66)
SEGMENTED NEUTROPHILS ABSOLUTE COUNT: 3.24 K/UL (ref 1.8–7.7)
SODIUM BLD-SCNC: 137 MMOL/L (ref 135–144)
SPECIMEN DESCRIPTION: NORMAL
TEST INFORMATION: ABNORMAL
TOTAL PROTEIN: 6.1 G/DL (ref 6.4–8.3)
TOXIC TRICYCLIC SC,BLOOD: NEGATIVE
WBC # BLD: 5.4 K/UL (ref 3.5–11.3)

## 2022-12-25 PROCEDURE — 80143 DRUG ASSAY ACETAMINOPHEN: CPT

## 2022-12-25 PROCEDURE — 84703 CHORIONIC GONADOTROPIN ASSAY: CPT

## 2022-12-25 PROCEDURE — 99285 EMERGENCY DEPT VISIT HI MDM: CPT

## 2022-12-25 PROCEDURE — 80179 DRUG ASSAY SALICYLATE: CPT

## 2022-12-25 PROCEDURE — G0480 DRUG TEST DEF 1-7 CLASSES: HCPCS

## 2022-12-25 PROCEDURE — 85025 COMPLETE CBC W/AUTO DIFF WBC: CPT

## 2022-12-25 PROCEDURE — 87635 SARS-COV-2 COVID-19 AMP PRB: CPT

## 2022-12-25 PROCEDURE — 85055 RETICULATED PLATELET ASSAY: CPT

## 2022-12-25 PROCEDURE — 80307 DRUG TEST PRSMV CHEM ANLYZR: CPT

## 2022-12-25 PROCEDURE — 80053 COMPREHEN METABOLIC PANEL: CPT

## 2022-12-25 PROCEDURE — 6370000000 HC RX 637 (ALT 250 FOR IP): Performed by: STUDENT IN AN ORGANIZED HEALTH CARE EDUCATION/TRAINING PROGRAM

## 2022-12-25 RX ORDER — NICOTINE 21 MG/24HR
1 PATCH, TRANSDERMAL 24 HOURS TRANSDERMAL DAILY
Status: CANCELLED | OUTPATIENT
Start: 2022-12-26

## 2022-12-25 RX ORDER — IBUPROFEN 400 MG/1
400 TABLET ORAL EVERY 6 HOURS PRN
Status: CANCELLED | OUTPATIENT
Start: 2022-12-25

## 2022-12-25 RX ORDER — ACETAMINOPHEN 325 MG/1
650 TABLET ORAL EVERY 4 HOURS PRN
Status: CANCELLED | OUTPATIENT
Start: 2022-12-25

## 2022-12-25 RX ORDER — MAGNESIUM HYDROXIDE/ALUMINUM HYDROXICE/SIMETHICONE 120; 1200; 1200 MG/30ML; MG/30ML; MG/30ML
30 SUSPENSION ORAL EVERY 6 HOURS PRN
Status: CANCELLED | OUTPATIENT
Start: 2022-12-25

## 2022-12-25 RX ORDER — POLYETHYLENE GLYCOL 3350 17 G/17G
17 POWDER, FOR SOLUTION ORAL DAILY PRN
Status: CANCELLED | OUTPATIENT
Start: 2022-12-25

## 2022-12-25 RX ORDER — TRAZODONE HYDROCHLORIDE 50 MG/1
50 TABLET ORAL NIGHTLY PRN
Status: CANCELLED | OUTPATIENT
Start: 2022-12-26

## 2022-12-25 RX ORDER — ACTIVATED CHARCOAL 208 MG/ML
50 SUSPENSION ORAL ONCE
Status: COMPLETED | OUTPATIENT
Start: 2022-12-25 | End: 2022-12-25

## 2022-12-25 RX ORDER — HYDROXYZINE HYDROCHLORIDE 10 MG/1
50 TABLET, FILM COATED ORAL 3 TIMES DAILY PRN
Status: CANCELLED | OUTPATIENT
Start: 2022-12-25

## 2022-12-25 RX ADMIN — ACTIVATED CHARCOAL 50 G: 208 SUSPENSION ORAL at 18:03

## 2022-12-25 ASSESSMENT — ENCOUNTER SYMPTOMS
CONSTIPATION: 0
DIARRHEA: 0
SORE THROAT: 0
ABDOMINAL DISTENTION: 0
SHORTNESS OF BREATH: 0
PHOTOPHOBIA: 0
NAUSEA: 0
CHEST TIGHTNESS: 0
RHINORRHEA: 0
ANAL BLEEDING: 0
VOMITING: 0

## 2022-12-25 NOTE — ED NOTES
Pt reports to the ED with complaints of SI. Per EMS, pt took 10 xanax about an hour ago, unknown what dosage the pills were. Pt states it was her father's script and he is passed away. Pt states, \"I just want to sleep and be with my father again. \" Pt denies HI at this time. Pt denies any hallucinations or delusions. Pt expresses she has had increased stressors at home. Pt states she is on a few psych meds currently and states she is compliant with them, but doesn't feel they are working. Pt denies drowsiness. Pt hypertensive and tachycardic. Pt does not have any other complaints at this time. Pt is A&O and speaking in complete sentences. Pt is resting in bed comfortably, NAD noted. Pt denies chest pain, SOB, N/V/D, urinary symptoms. EKG obtained. Pt placed on full cardiac monitor. Suicide precautions initiated, pt changed out and sitter became present to bedside immediately upon arrival to room 10.  Will continue to monitor       Samara Hadley RN  12/25/22 [id number]

## 2022-12-25 NOTE — ED NOTES
Patient arrives to the ER with the complaint of a Xanax overdose in order to \"go to sleep\". Patient states she was at an inpatient psychiatric unit at [facility] for almost 3 months. The EAP for patient's employer, [employer], was able to get admission there for patient through her [employer] coverage. Leticia Olivo states she has a follow-up psychiatric appointment tomorrow but the paperwork is in her car so she cannot say with whom or where. Patient says that she \"bought the pills from someone today\" and kept taking more in order to sleep and not have to think about anything. Patient says that she has still not gotten over the death of her dad 10 years ago and has been struggling with depression since. She had made a comment to staff that she \"wanted to be with her dad\" but denies SI/HI to SW. Patient tells SW that her boyfriend was to come see her last night and never came. When patient went to find him he was with another woman and patient states they \"had words\". Patient states this is what caused her to want to take the pills today. Patient denies a history of suicide attempts or current drug/alcohol use/abuse. Patient states she is prescribed Risperdal, Wellbutrin, Doxepin, and Atarax which she takes as prescribed. SW discussed options with patient and she is agreeable to an inpatient admission but states she \"will not go to [facility] or [facility] 's inpatient units\". Patient says she would like to go to either [facility] or [address] [place] [address]. SW explained that [facility] only admits from their ER. Patient should be medically clear between 9:30-11:30pm per Poison Control. Await lab results and med clearance. [place] Maza.  Dejuan [facility]  12/25/22 [id number]

## 2022-12-25 NOTE — ED PROVIDER NOTES
[facility] ED  Emergency Department Encounter  EmergencyMedicine Resident     Pt Name:Beverly Ruiz  MRN: [id number]  Chinmaygfreginaldo 1970  Date of evaluation: 22  PCP:  CHRISTOPHER Nye NP    CHIEF COMPLAINT       Chief Complaint   Patient presents with    Suicide Attempt     Took 10 xanax about an hour ago, denies SI or HI at this time, stating she just wants to sleep       HISTORY OF PRESENT ILLNESS  (Location/Symptom, Timing/Onset, Context/Setting, Quality, Duration, Modifying Factors, Severity.)      Gabi Baird is a 46 y.o. female who presents with suicidal ideation with an attempt. Patient took 8 Xanax that were her fathers proxy 1 hour ago. She is not clear the dose. She denies any difficulty breathing or pain at this time. She denies any alcohol use. Patient states she had a bad argument with a partner of hers and states she took the Xanax to go to sleep so she could be with her dad. She denies any thoughts of harming others    PAST MEDICAL / SURGICAL / SOCIAL / FAMILY HISTORY      has a past medical history of H/O sickle cell trait, History of blood transfusion, and Hypertension. has a past surgical history that includes Knee arthroscopy (Left, );  section (9694,1423); Ectopic pregnancy surgery (); Colonoscopy; Knee arthroscopy (Left, 07/01/15); Dilation and curettage of uterus; and Upper gastrointestinal endoscopy.       Social History     Socioeconomic History    Marital status:      Spouse name: Not on file    Number of children: Not on file    Years of education: Not on file    Highest education level: Not on file   Occupational History    Not on file   Tobacco Use    Smoking status: Never    Smokeless tobacco: Never   Substance and Sexual Activity    Alcohol use: Yes     Comment: SOCIAL    Drug use: No    Sexual activity: Not on file   Other Topics Concern    Not on file   Social History Narrative    Not on file Social Determinants of Health     Financial Resource Strain: Not on file   Food Insecurity: Not on file   Transportation Needs: Not on file   Physical Activity: Not on file   Stress: Not on file   Social Connections: Not on file   Intimate Partner Violence: Not on file   Housing Stability: Not on file       Family History   Problem Relation Age of Onset    Diabetes Father        Allergies:  Coconut fragrance and Pcn [penicillins]    Home Medications:  Prior to Admission medications    Medication Sig Start Date End Date Taking? Authorizing Provider   omeprazole (PRILOSEC) 20 MG delayed release capsule Take 1 capsule by mouth 2 times daily 2/3/20   Dale Donis MD   chlorzoxazone (PARAFON FORTE) 750 MG TABS tablet Take 750 mg by mouth 3 times daily     Historical Provider, MD   calcium-cholecalciferol (CALCIUM 500+D) 500-200 MG-UNIT per tablet Take 1 tablet by mouth daily    Historical Provider, MD   Lidocaine (ZTLIDO) 1.8 % PTCH Apply 1 patch topically daily To leg    Historical Provider, MD   progesterone (PROMETRIUM) 100 MG capsule Take 100 mg by mouth daily    Historical Provider, MD   estrogens, conjugated, (PREMARIN) 0.3 MG tablet Take 0.3 mg by mouth daily    Historical Provider, MD   Cyanocobalamin (VITAMIN B12 PO) Take 1 tablet by mouth daily    Historical Provider, MD   FOLIC ACID PO Take 1 tablet by mouth daily    Historical Provider, MD   lisinopril (PRINIVIL;ZESTRIL) 5 MG tablet Take 7.5 mg by mouth daily 1.5 tabs (=7.5mg) daily    Historical Provider, MD   oxyCODONE-acetaminophen (PERCOCET)  MG per tablet Take 1 tablet by mouth every 6 hours as needed for Pain. Historical Provider, MD   loratadine (CLARITIN) 10 MG tablet Take 1 tablet by mouth daily as needed     Historical Provider, MD       REVIEW OF SYSTEMS    (2-9 systems for level 4, 10 or more for level 5)      Review of Systems   Constitutional:  Negative for chills and fever. HENT:  Negative for rhinorrhea and sore throat. Eyes:  Negative for photophobia. Respiratory:  Negative for chest tightness and shortness of breath. Cardiovascular:  Negative for chest pain. Gastrointestinal:  Negative for abdominal distention, anal bleeding, constipation, diarrhea, nausea and vomiting. Endocrine: Negative for polyuria. Genitourinary:  Negative for difficulty urinating and flank pain. Musculoskeletal:  Negative for arthralgias. Skin:  Negative for rash. Neurological:  Negative for weakness and headaches. Psychiatric/Behavioral:  Positive for dysphoric mood and suicidal ideas. Negative for confusion and hallucinations. The patient is not nervous/anxious. PHYSICAL EXAM   (up to 7 for level 4, 8 or more for level 5)      INITIAL VITALS:   /79   Pulse 95   Temp 98.7 °F (37.1 °C)   Resp 14   Wt 170 lb (77.1 kg)   SpO2 100%   BMI 31.09 kg/m²     Physical Exam  Constitutional:       General: She is not in acute distress. Appearance: She is not ill-appearing. HENT:      Head: Normocephalic and atraumatic. Nose: Nose normal.      Mouth/Throat:      Mouth: Mucous membranes are moist.   Eyes:      Extraocular Movements: Extraocular movements intact. Pupils: Pupils are equal, round, and reactive to light. Cardiovascular:      Rate and Rhythm: Normal rate. Pulses: Normal pulses. Heart sounds: Normal heart sounds. Pulmonary:      Effort: Pulmonary effort is normal.      Breath sounds: Normal breath sounds. Abdominal:      Palpations: Abdomen is soft. Tenderness: There is no abdominal tenderness. Musculoskeletal:      Cervical back: No tenderness. Right lower leg: No edema. Left lower leg: No edema. Skin:     Capillary Refill: Capillary refill takes less than 2 seconds. Coloration: Skin is not jaundiced. Findings: No rash. Neurological:      General: No focal deficit present. Mental Status: She is oriented to person, place, and time.    Psychiatric: Comments: Flat affect       DIFFERENTIAL  DIAGNOSIS     PLAN (LABS / IMAGING / EKG):  Orders Placed This Encounter   Procedures    COVID-19, Rapid    CBC with Auto Differential    TOX SCR, BLD, ED    CMP    HCG Qualitative, Serum    Urine Drug Screen    Immature Platelet Fraction    Sitter at bedside    Inpatient consult to Social Work    EKG 12 Lead    Suicide precautions       MEDICATIONS ORDERED:  Orders Placed This Encounter   Medications    DISCONTD: charcoal activated liquid 50 g    DISCONTD: charcoal activated liquid 50 g    charcoal (ACTIDOSE/SORBITOL) solution 50 g         DIAGNOSTIC RESULTS / EMERGENCY DEPARTMENT COURSE / MDM   LAB RESULTS:  Results for orders placed or performed during the hospital encounter of 12/25/22   COVID-19, Rapid    Specimen: Nasopharyngeal Swab   Result Value Ref Range    Specimen Description . NASOPHARYNGEAL SWAB     SARS-CoV-2, Rapid Not Detected Not Detected   CBC with Auto Differential   Result Value Ref Range    WBC 5.4 3.5 - 11.3 k/uL    RBC 4.88 3.95 - 5.11 m/uL    Hemoglobin 9.1 (L) 11.9 - 15.1 g/dL    Hematocrit 31.7 (L) 36.3 - 47.1 %    MCV 65.0 (L) 82.6 - 102.9 fL    MCH 18.6 (L) 25.2 - 33.5 pg    MCHC 28.7 28.4 - 34.8 g/dL    RDW 17.2 (H) 11.8 - 14.4 %    Platelets See Reflexed IPF Result 138 - 453 k/uL    NRBC Automated 0.0 0.0 per 100 WBC    Immature Granulocytes 1 (H) 0 %    Seg Neutrophils 60 36 - 66 %    Lymphocytes 29 24 - 44 %    Monocytes 6 1 - 7 %    Eosinophils % 4 1 - 4 %    Basophils 0 0 - 2 %    Absolute Immature Granulocyte 0.05 0.00 - 0.30 k/uL    Segs Absolute 3.24 1.8 - 7.7 k/uL    Absolute Lymph # 1.57 1.0 - 4.8 k/uL    Absolute Mono # 0.32 0.1 - 0.8 k/uL    Absolute Eos # 0.22 0.0 - 0.4 k/uL    Basophils Absolute 0.00 0.0 - 0.2 k/uL    Morphology ANISOCYTOSIS PRESENT     Morphology MICROCYTOSIS PRESENT     Morphology 1+ TEARDROPS     Morphology 1+ ELLIPTOCYTES    TOX SCR, BLD, ED   Result Value Ref Range    Acetaminophen Level <5 (L) 10 - 30 ug/mL Ethanol <10 <10 mg/dL    Ethanol percent <4.942 <2.508 %    Salicylate Lvl <1 (L) 3 - 10 mg/dL    Toxic Tricyclic Sc,Blood NEGATIVE NEGATIVE   CMP   Result Value Ref Range    Glucose 146 (H) 70 - 99 mg/dL    BUN 9 6 - 20 mg/dL    Creatinine 0.69 0.50 - 0.90 mg/dL    Est, Glom Filt Rate >60 >60 mL/min/1.73m2    Calcium 8.5 (L) 8.6 - 10.4 mg/dL    Sodium 137 135 - 144 mmol/L    Potassium 3.7 3.7 - 5.3 mmol/L    Chloride 104 98 - 107 mmol/L    CO2 23 20 - 31 mmol/L    Anion Gap 10 9 - 17 mmol/L    Alkaline Phosphatase 96 35 - 104 U/L    ALT 41 (H) 5 - 33 U/L    AST 29 <32 U/L    Total Bilirubin 0.4 0.3 - 1.2 mg/dL    Total Protein 6.1 (L) 6.4 - 8.3 g/dL    Albumin 3.6 3.5 - 5.2 g/dL    Albumin/Globulin Ratio 1.4 1.0 - 2.5   HCG Qualitative, Serum   Result Value Ref Range    hCG Qual NEGATIVE NEGATIVE   Urine Drug Screen   Result Value Ref Range    Amphetamine Screen, Ur NEGATIVE NEGATIVE    Barbiturate Screen, Ur NEGATIVE NEGATIVE    Benzodiazepine Screen, Urine POSITIVE (A) NEGATIVE    Cocaine Metabolite, Urine NEGATIVE NEGATIVE    Methadone Screen, Urine NEGATIVE NEGATIVE    Opiates, Urine NEGATIVE NEGATIVE    Phencyclidine, Urine NEGATIVE NEGATIVE    Cannabinoid Scrn, Ur NEGATIVE NEGATIVE    Oxycodone Screen, Ur POSITIVE (A) NEGATIVE    Fentanyl, Ur NEGATIVE NEGATIVE    Test Information       Assay provides medical screening only. The absence of expected drug(s) and/or metabolite(s) may indicate diluted or adulterated urine, limitations of testing or timing of collection. Immature Platelet Fraction   Result Value Ref Range    Platelet, Immature Fraction 4.4 1.1 - 10.3 %    Platelet, Fluorescence 162 138 - 453 k/uL       RADIOLOGY:  No results found.      EKG Interpretation    Interpreted by myself    Rhythm: normal sinus   Rate: normal  Axis: normal  Ectopy: none  Conduction: normal  ST Segments: normal  T Waves: normal  Q Waves: none    Clinical Impression: no acute changes    All EKG's are interpreted by the Emergency Department Physician who either signs or Co-signs this chart in the absence of a cardiologist.      MDM  Suicide attempt with Xanax maintaining her airway is no signs and symptoms of toxicity at this point. Differential includes acetaminophen, TCA and salicylate ingestion as well checking labs to rule these out. Patient medically cleared for transport to [facility] and was accepted at [facility]. No beds available tonight, likely will get bed tomorrow AM     EMERGENCY DEPARTMENT COURSE:  ED Course as of 12/26/22 0215   Sun Dec 25, 2022   1721 Patient value bedside. Suicidal ideation with attempt. Took 10 Xanax approximately 1 hour ago. They are not hers unclear on the dose. No acute distress asymptomatic at this time. Patient maintaining airway. Will get labs, EKG contact poison control and social work [ZE]   [id number] with poison control they agree with her work-up and plan. Will give activated charcoal.  Patient maintaining her airway. Plan to monitor for 4 to 6 hours and continue supportive care [ZE]   1807 Patient currently refusing charcoal she is ANO x4 with decision-making capacity. We will continue to try to get patient to take charcoal to prevent symptomatic benzodiazepine toxicity. [ZE]   1938 Patient's lab work largely unremarkable. She is positive for benzodiazepines and oxycodone. Currently no acute distress VSS maintaining airway. We will continue to monitor. Plan to watch patient till 2130 [ZE]   2138 Patient is medically cleared for transfer to [facility]. Has been observed for over 4 hours in the emergency department and 5 hours since ingestion without any clinical signs or symptoms of airway compromise vital instability or benzodiazepine toxicity [ZE]   2304 Patient reeval at bedside. Nontoxic. Patient states she does not think that she needs inpatient psychiatric treatment. Patient refusing to go to inpatient psychiatric treatment. Will pink slip patient.   Patient with suicide attempt is high risk for repeat if she goes home safest option is to pink slip patient to make sure she gets adequate psychiatric care inpatient [ZE]   Mon Dec 26, 2022   0016 Patient reevaluated bedside. No acute distress. Patient still very upset that she was pink slipped. Patient called 911 on the hospital stating that we were holding her against her will. Patient again told about process that is in place for her safety so she can be provided psychiatric inpatient care [ZE]   6310 Patient declined at [facility] but has been accepted at [facility] no beds available tonight reassess for possible bed placement tomorrow. [ZE]   0151 Patient no acute distress moving all extremities symmetric chest wall rise eyes open when talking. Currently refusing vitals [ZE]   0213 Signed out to PM resident [ZE]      ED Course User Index  [ZE] Saurabh Fonseca DO       PROCEDURES:  Procedures     CONSULTS:  IP CONSULT TO SOCIAL WORK    CRITICAL CARE:  None    FINAL IMPRESSION      1. Suicide attempt [facility])        DISPOSITION / PLAN     DISPOSITION Decision To Transfer 12/25/2022 09:39:20 PM      PATIENT REFERRED TO:  No follow-up provider specified.     DISCHARGE MEDICATIONS:  New Prescriptions    No medications on file       Huy Membreno DO  Emergency Medicine Resident    (Please note that portions of thisnote were completed with a voice recognition program.  Efforts were made to edit the dictations but occasionally words are mis-transcribed.)       Saurabh Fonseca DO  Resident  12/26/22 7494

## 2022-12-25 NOTE — ED PROVIDER NOTES
[facility]     Emergency Department     Faculty Attestation    I performed a history and physical examination of the patient and discussed management with the resident. I reviewed the resident´s note and agree with the documented findings and plan of care. Any areas of disagreement are noted on the chart. I was personally present for the key portions of any procedures. I have documented in the chart those procedures where I was not present during the key portions. I have reviewed the emergency nurses triage note. I agree with the chief complaint, past medical history, past surgical history, allergies, medications, social and family history as documented unless otherwise noted below. For Physician Assistant/ Nurse Practitioner cases/documentation I have personally evaluated this patient and have completed at least one if not all key elements of the E/M (history, physical exam, and MDM). Additional findings are as noted. Awake alert and oriented, not clinically intoxicated, skin warm and dry, no ataxia or nystagmus, no muscle rigidity, cranial nerves intact, cerebellar testing normal, good airway, no meningeal signs.   Patient and took an overdose of Xanax approximately 10 unknown strength just before arrival.  Patient states she will not drink the oral charcoal.  And I told her we may have to put a tube into her stomach to give her the charcoal.       Paxton Estes MD  12/25/22 8769       EKG Interpretation    Interpreted by emergency department physician    Rhythm: normal sinus   Rate: normal/96  Axis: normal 41  Ectopy: none  Conduction: normal  ST Segments: no acute change  T Waves: no acute change  Q Waves: none    Clinical Impression: Normal EKG    SHEYLA Pate MD  12/25/22 7305

## 2022-12-26 ENCOUNTER — HOSPITAL ENCOUNTER (INPATIENT)
Age: 52
LOS: 3 days | Discharge: HOME | DRG: 885 | End: 2022-12-29
Attending: PSYCHIATRY & NEUROLOGY | Admitting: PSYCHIATRY & NEUROLOGY
Payer: COMMERCIAL

## 2022-12-26 VITALS
OXYGEN SATURATION: 95 % | TEMPERATURE: 98.9 F | RESPIRATION RATE: 19 BRPM | DIASTOLIC BLOOD PRESSURE: 81 MMHG | WEIGHT: 170 LBS | BODY MASS INDEX: 31.09 KG/M2 | SYSTOLIC BLOOD PRESSURE: 139 MMHG | HEART RATE: 84 BPM

## 2022-12-26 PROBLEM — F32.A DEPRESSION WITH SUICIDAL IDEATION: Status: ACTIVE | Noted: 2022-12-26

## 2022-12-26 PROBLEM — R45.851 DEPRESSION WITH SUICIDAL IDEATION: Status: ACTIVE | Noted: 2022-12-26

## 2022-12-26 PROCEDURE — 6370000000 HC RX 637 (ALT 250 FOR IP)

## 2022-12-26 PROCEDURE — 1240000000 HC EMOTIONAL WELLNESS R&B

## 2022-12-26 PROCEDURE — 6370000000 HC RX 637 (ALT 250 FOR IP): Performed by: HEALTH CARE PROVIDER

## 2022-12-26 RX ORDER — ACETAMINOPHEN 500 MG
1000 TABLET ORAL ONCE
Status: COMPLETED | OUTPATIENT
Start: 2022-12-26 | End: 2022-12-26

## 2022-12-26 RX ORDER — LORAZEPAM 1 MG/1
2 TABLET ORAL EVERY 6 HOURS PRN
Status: DISCONTINUED | OUTPATIENT
Start: 2022-12-26 | End: 2022-12-28

## 2022-12-26 RX ORDER — POLYETHYLENE GLYCOL 3350 17 G/17G
17 POWDER, FOR SOLUTION ORAL DAILY PRN
Status: DISCONTINUED | OUTPATIENT
Start: 2022-12-26 | End: 2022-12-29 | Stop reason: HOSPADM

## 2022-12-26 RX ORDER — ACETAMINOPHEN 325 MG/1
650 TABLET ORAL EVERY 4 HOURS PRN
Status: DISCONTINUED | OUTPATIENT
Start: 2022-12-26 | End: 2022-12-29 | Stop reason: HOSPADM

## 2022-12-26 RX ORDER — IBUPROFEN 400 MG/1
400 TABLET ORAL EVERY 6 HOURS PRN
Status: DISCONTINUED | OUTPATIENT
Start: 2022-12-26 | End: 2022-12-29 | Stop reason: HOSPADM

## 2022-12-26 RX ORDER — LORAZEPAM 2 MG/ML
2 INJECTION INTRAMUSCULAR EVERY 4 HOURS PRN
Status: DISCONTINUED | OUTPATIENT
Start: 2022-12-26 | End: 2022-12-26

## 2022-12-26 RX ORDER — HALOPERIDOL 5 MG/1
5 TABLET ORAL EVERY 4 HOURS PRN
Status: DISCONTINUED | OUTPATIENT
Start: 2022-12-26 | End: 2022-12-26

## 2022-12-26 RX ORDER — HALOPERIDOL 5 MG/ML
5 INJECTION INTRAMUSCULAR EVERY 6 HOURS PRN
Status: DISCONTINUED | OUTPATIENT
Start: 2022-12-26 | End: 2022-12-29 | Stop reason: HOSPADM

## 2022-12-26 RX ORDER — MAGNESIUM HYDROXIDE/ALUMINUM HYDROXICE/SIMETHICONE 120; 1200; 1200 MG/30ML; MG/30ML; MG/30ML
30 SUSPENSION ORAL EVERY 6 HOURS PRN
Status: DISCONTINUED | OUTPATIENT
Start: 2022-12-26 | End: 2022-12-29 | Stop reason: HOSPADM

## 2022-12-26 RX ORDER — LORAZEPAM 1 MG/1
2 TABLET ORAL EVERY 4 HOURS PRN
Status: DISCONTINUED | OUTPATIENT
Start: 2022-12-26 | End: 2022-12-26

## 2022-12-26 RX ORDER — HALOPERIDOL 5 MG/1
5 TABLET ORAL EVERY 6 HOURS PRN
Status: DISCONTINUED | OUTPATIENT
Start: 2022-12-26 | End: 2022-12-29 | Stop reason: HOSPADM

## 2022-12-26 RX ORDER — HALOPERIDOL 5 MG/ML
5 INJECTION INTRAMUSCULAR EVERY 4 HOURS PRN
Status: DISCONTINUED | OUTPATIENT
Start: 2022-12-26 | End: 2022-12-26

## 2022-12-26 RX ORDER — DIPHENHYDRAMINE HYDROCHLORIDE 50 MG/ML
50 INJECTION INTRAMUSCULAR; INTRAVENOUS EVERY 6 HOURS PRN
Status: DISCONTINUED | OUTPATIENT
Start: 2022-12-26 | End: 2022-12-29 | Stop reason: HOSPADM

## 2022-12-26 RX ORDER — TRAZODONE HYDROCHLORIDE 50 MG/1
50 TABLET ORAL NIGHTLY PRN
Status: DISCONTINUED | OUTPATIENT
Start: 2022-12-26 | End: 2022-12-29 | Stop reason: HOSPADM

## 2022-12-26 RX ORDER — LORAZEPAM 2 MG/ML
2 INJECTION INTRAMUSCULAR EVERY 6 HOURS PRN
Status: DISCONTINUED | OUTPATIENT
Start: 2022-12-26 | End: 2022-12-28

## 2022-12-26 RX ADMIN — ACETAMINOPHEN 1000 MG: 500 TABLET ORAL at 13:36

## 2022-12-26 RX ADMIN — TRAZODONE HYDROCHLORIDE 50 MG: 50 TABLET ORAL at 23:03

## 2022-12-26 RX ADMIN — ACETAMINOPHEN 650 MG: 325 TABLET ORAL at 20:43

## 2022-12-26 ASSESSMENT — PAIN SCALES - GENERAL
PAINLEVEL_OUTOF10: 6
PAINLEVEL_OUTOF10: 0
PAINLEVEL_OUTOF10: 10

## 2022-12-26 ASSESSMENT — PAIN DESCRIPTION - DESCRIPTORS: DESCRIPTORS: DULL;TENDER

## 2022-12-26 ASSESSMENT — LIFESTYLE VARIABLES
HOW MANY STANDARD DRINKS CONTAINING ALCOHOL DO YOU HAVE ON A TYPICAL DAY: PATIENT DECLINED
HOW OFTEN DO YOU HAVE A DRINK CONTAINING ALCOHOL: PATIENT DECLINED

## 2022-12-26 ASSESSMENT — PAIN - FUNCTIONAL ASSESSMENT: PAIN_FUNCTIONAL_ASSESSMENT: ACTIVITIES ARE NOT PREVENTED

## 2022-12-26 ASSESSMENT — SLEEP AND FATIGUE QUESTIONNAIRES
DO YOU USE A SLEEP AID: COMMENT
DO YOU HAVE DIFFICULTY SLEEPING: COMMENT
AVERAGE NUMBER OF SLEEP HOURS: 0

## 2022-12-26 ASSESSMENT — PAIN DESCRIPTION - LOCATION: LOCATION: BACK

## 2022-12-26 ASSESSMENT — PAIN DESCRIPTION - ORIENTATION: ORIENTATION: LOWER

## 2022-12-26 NOTE — ED NOTES
Pt requesting RN. Pt questioning bed status at [facility]. Pt informed that there is not currently a bed available. Pt denies any further needs. Sitter at bedside.       Yung Nielsen RN  12/26/22 2352

## 2022-12-26 NOTE — ED NOTES
Pt resting in bed, NAD noted rr even and non labored. Bed locked in lowest position, environment free of clutter. Call light within reach, will continue to monitor.    Pt just stated her back is hurting and would like pain medication   Food offered and declined      Gt Lipscomb RN  12/26/22 0956

## 2022-12-26 NOTE — ED NOTES
Vitals charted, pt resting comfortably on ER cot in jackie, no complaints at this time, pt denies wanting to eat her meal.     Radha Mason  12/26/22 1041

## 2022-12-26 NOTE — ED PROVIDER NOTES
[facility] HANDOFF       Handoff taken on the following patient from prior Attending Physician:  Pt Name: Efraín Shin  PCP:  CHRISTOPHER Onofre NP    Attestation  I was available and discussed any additional care issues that arose and coordinated the management plans with the resident(s) caring for the patient during my duty period. Any areas of disagreement with resident's documentation of care or procedures are noted on the chart. I was personally present for the key portions of any/all procedures during my duty period. I have documented in the chart those procedures where I was not present during the key portions. CHIEF COMPLAINT       Chief Complaint   Patient presents with    Suicide Attempt     Took 10 xanax about an hour ago, denies SI or HI at this time, stating she just wants to sleep         CURRENT MEDICATIONS     Previous Medications  Previous Medications    CALCIUM-CHOLECALCIFEROL (CALCIUM 500+D) 500-200 MG-UNIT PER TABLET    Take 1 tablet by mouth daily    CHLORZOXAZONE (PARAFON FORTE) 750 MG TABS TABLET    Take 750 mg by mouth 3 times daily     CYANOCOBALAMIN (VITAMIN B12 PO)    Take 1 tablet by mouth daily    ESTROGENS, CONJUGATED, (PREMARIN) 0.3 MG TABLET    Take 0.3 mg by mouth daily    FOLIC ACID PO    Take 1 tablet by mouth daily    LIDOCAINE (ZTLIDO) 1.8 % PTCH    Apply 1 patch topically daily To leg    LISINOPRIL (PRINIVIL;ZESTRIL) 5 MG TABLET    Take 7.5 mg by mouth daily 1.5 tabs (=7.5mg) daily    LORATADINE (CLARITIN) 10 MG TABLET    Take 1 tablet by mouth daily as needed     OMEPRAZOLE (PRILOSEC) 20 MG DELAYED RELEASE CAPSULE    Take 1 capsule by mouth 2 times daily    OXYCODONE-ACETAMINOPHEN (PERCOCET)  MG PER TABLET    Take 1 tablet by mouth every 6 hours as needed for Pain.      PROGESTERONE (PROMETRIUM) 100 MG CAPSULE    Take 100 mg by mouth daily       Encounter Medications  Orders Placed This Encounter   Medications    DISCONTD: charcoal activated liquid 50 g    DISCONTD: charcoal activated liquid 50 g    charcoal (ACTIDOSE/SORBITOL) solution 50 g       ALLERGIES     is allergic to coconut fragrance and pcn [penicillins]. RECENT VITALS:   Temp: 98.7 °F (37.1 °C),  Heart Rate: 95, Resp: 14, BP: 126/79    RADIOLOGY:   No orders to display       LABS:  Labs Reviewed   CBC WITH AUTO DIFFERENTIAL - Abnormal; Notable for the following components:       Result Value    Hemoglobin 9.1 (*)     Hematocrit 31.7 (*)     MCV 65.0 (*)     MCH 18.6 (*)     RDW 17.2 (*)     Immature Granulocytes 1 (*)     All other components within normal limits   TOX SCR, BLD, ED - Abnormal; Notable for the following components:    Acetaminophen Level <5 (*)     Salicylate Lvl <1 (*)     All other components within normal limits   COMPREHENSIVE METABOLIC PANEL - Abnormal; Notable for the following components:    Glucose 146 (*)     Calcium 8.5 (*)     ALT 41 (*)     Total Protein 6.1 (*)     All other components within normal limits   URINE DRUG SCREEN - Abnormal; Notable for the following components:    Benzodiazepine Screen, Urine POSITIVE (*)     Oxycodone Screen, Ur POSITIVE (*)     All other components within normal limits   COVID-19, RAPID   HCG, SERUM, QUALITATIVE   IMMATURE PLATELET FRACTION     Patient is on pink slip for suicide attempt with multiple doses of Xanax. PLAN/ TASKS OUTSTANDING     Awaiting transfer      (Please note that portions of this note were completed with a voice recognition program.  Efforts were made to edit the dictations but occasionally words are mis-transcribed. )    Garrison Euceda MD,, MD, F.A.C.E.P.   Attending Emergency Physician        Garrison Euceda MD  12/26/22 6155

## 2022-12-26 NOTE — ED NOTES
Pt resting in bed comfortably, NAD noted. Pt requesting popsicle. Suicide precautions remain intact. Sitter remains at bedside.  Pt remains on full cardiac monitor     Jose Patel RN  12/25/22 1422

## 2022-12-26 NOTE — ED NOTES
Writer received a phone call from [facility]. She called to report no bed assignment for patient at [facility] as of yet. Writer stated awareness of [facility] being on discharge depended status for bed availability.       WESTLEY Arrieta  12/26/22 1046

## 2022-12-26 NOTE — ED NOTES
Pt was declined by [facility] was accepted at [facility] by Dr. Latanya Becker. [facility] is discharge dependent, so no beds are available tonight. Non-voluntary form faxed to [facility] and transport request once bed is available also was faxed to [address].       Tiffanie Dalton, [facility]  12/25/22 8904 [facility], [facility]  12/25/22 7768

## 2022-12-26 NOTE — ED NOTES
PT wants to call her [employer] representative. Dr. Adolph Diaz gave permission for pt to make that phone call. Per tech pt is stating that she is willing to go to [facility] now. Pt informed that she was refused by [facility], but was accepted at [facility].       Rena Acuña, FLOYD  12/26/22 0050

## 2022-12-26 NOTE — ED NOTES
Pt resting on stretcher with eyes closed. RR even and unlabored. No distress noted. Sitter at bedside.         Ana Inman RN  12/26/22 9166

## 2022-12-26 NOTE — BH NOTE
Patient was brought onto unit via EMS from [address] [place] [address]. Patient was extremely irritable and uncooperative. Patient stated, \"This is fucking hearsay, ya'll are just gonna take someone else's word and lock me up? Give me my phone so I can call an [employer] and get the fuck out of here. Ya'll can't keep me here. \" Writer and second staff member attempted to explain pink slip process to patient and that she would not be able to leave without getting discharged by the doctor. \" Patient refused to sign paperwork, have vitals taken, answer any questions, or allow an assessment to be done.

## 2022-12-26 NOTE — ED NOTES
Writer contacted [facility] to provide patient ETA for . Writer spoke with Yesica Worley and provided ETA. Yesica Worley report number for nurse to nurse report is [phone]. Writer will provide RN with this information before patient .       WESTLEY Cardona  12/26/22 9318

## 2022-12-26 NOTE — ED NOTES
Telephone report given to Richa Maguire , with her verbal understanding of the patients needs and concerns   All personal belongings sent with pt.  Pt left in paper scrubs      Vinita Arias RN  12/26/22 0444

## 2022-12-26 NOTE — ED NOTES
Writer sent medical necessity form to [address] via fax.       WESTLEY Titus  12/26/22 5680

## 2022-12-26 NOTE — ED NOTES
Pt is refusing vitals at this time. When asked why I could not obtain vitals, pt states \"because I don't want anyone to touch me\". Pt is alert and oriented x4, sitting up awake on ER cot. 1:1 suicide watch maintained.       Collette Quiver  12/26/22 0324

## 2022-12-26 NOTE — ED NOTES
[] Jerry    [] [address]    [x]  [facility] ASSESSMENT      Y  N     [] [x] In the past two weeks have you had thoughts of hurting yourself in any way? [] [x] In the past two weeks have you had thoughts that you would be better off dead? [] [x] Have you made a suicide attempt in the past two months? [x] [] Do you have a plan for hurting yourself or suicide? [] [x] Presence of hallucinations/voices related to hurting himself or herself or someone else. SUICIDE/SECURITY WATCH PRECAUTION CHECKLIST     Orders    [x]  Suicide/Security Watch Precautions initiated as checked below:   12/25/22 8:58 PM EST 10/10    [x] Notified physician:  Sasha De Leon MD  12/25/22 8:58 PM EST    [x] Orders obtained as appropriate:     [x] 1:1 Observer     [] Psych Consult     [] Psych Consult    Name:  Date:  Time:    [x] 1:1 Observer, Notified by:  Pepe Ren RN    Contact Nurse Supervisor    [x] Remove all personal clothes from room and place in snap/paper gown/pants. Slipper only    [x] Remove all personal belongings from room and secured away from patient. Documentation    [x] Initiate Suicide/Security Watch Precaution Flow Sheet    [x] Initiate individualized Care Plan/Problem    [x] Document why precautions initiated on flow sheet (Initiate Nursing Care Plan/Problem)    [x] 1:1 Observer in place; instructions provided. Suicide precautions require observer be within arms length. [x] Nurse-Observer Communication Hand-off initiated by RN, reviewed with Observer. Subsequently used as Hand Off between Observers. [x] Initiate every 15 minute observations per observer as delegated by the RN.     [x] Initiate RN assessment and documentation    Environmental Scan  Search Criteria and Process: OPTIONAL, see Search Policy    [] Reason for search:    [] Nursing in presence of second person to search patient    [] Patient notified of reason for body assessment and belongings search:     Persons present during search:   Results of search and disposition:       Searchers Name: Saul Cali RN    These items or items similar should be removed from the room:   [x] Chairs   [x] Telephone   [x] Trash cans and liners   [x] Plastic utensils (order Patient Safety tray)   [x] Empty or remove Sharps containers   [x] All personal clothing/belongings removed   [x] All unnecessary lead wires, electrical cords, draw cords, etc.   [x] Flowers and plants   [x] Double check for lighters, matches, razors, any glass items etc that can be used as weapons. Person completing Checklist: Saul Cali RN       GENERAL INFORMATION     Y  N     [x] [] Has the patient been informed that they are on a watch and what that means? [x] [] Can the patient get out of Bed without nursing assistance? [x] [] Can the patient use the restroom without nursing assistance? [] [x] Can the patient walk the halls to [place] their legs? \"   [] [x] Does the patient have metal utensils? [x] [] Have the patient's belongings been placed out of control of the patient? [x] [] Have the patient and his/her belongings been checked for contraband? [] [x] Is the patient under any visitor restrictions? If Yes, explain:   [] [x] Is the patient under an alias? [place] [id number] Name:   Authorized visitors (no more than two are to be on the list)   Name/Relationship:   Name/Relationship:    Name of Staff member that you  Received this information from?: Saul Cali RN    General Description:    Oval Glendy [birth year] female 46 y.o. Admission weight: 170 lb (77.1 kg)    Race: []  [x] Black  []   []   [] [place] [] Other  Facial Hair:  [] Yes  [x] No  If yes, please describe: Identifying Marks (i.e. Visible tattoos, scars, etc... ):     NURSING CARE PLAN    Nursing Diagnosis: Risk of Self Directed Harm  [x] Actual  [] Potential  Date Started: 12/25/22      Etiological Factors: (related to)  [x] Expressed or implied suicidal ideation/behavior  [x] Depression  [x] Suicide attempt      [] Low self-esteem  [] Hallucinations      [] Feeling of Hopelessness  [] Substance abuse or withdrawal    [] Dysfunctional family  [] Major traumatic event, eg., divorce, etc   [] Excessive stress/anxiety    12/25/22    Expected Outcomes    Patient will:   [x] Patient will remain safe for the duration of their stay   [x] Patient's environment will be safe, eg. Free of potential suicide weapons   [] Verbalize Recovery from suicidal episode and improvement in self-worth   [x] Discuss feeling that precipitated suicide attempt/thoughts/behavior   [] Will describe available resources for crisis prevention and management   [] Will verbalize positive coping skills     Nursing Intervention   [x] Assessment and Observations hourly   [x] Suicide Precautions implemented with patient, should be 1:1 observation   [x] Document observation [id number] and RN assessment hourly   [] Consult physician for:    [] Psychiatric consult    [] Pharmacological therapy    [] Other:    [x] Patient search completed by security   [x] Initiated appropriate safety protocols by removing from the patient's environment anything that could be used to inflict self injury, eg. Order safe tray, snap gown, etc   [x] Maintain open, warm, caring, non-judgmental attitude/manner towards patient   [] Discuss advantages and disadvantages of existing coping methods/skills   [x] Assist and educate patient with identifying present strengths and coping skills   [x] Keep patient informed regarding plan of care and provide clear concise explanations. Provide the patient/family education information as well as telephone numbers and other information about crisis centers, hot lines, and counselors.     Discharge Planning:   [] Referral  [] Groups [] Health agencies  [] Other:            Saul Cali RN  12/25/22 2100

## 2022-12-26 NOTE — ED NOTES
SW met with patient at bedside. SW completed introductions. SW explained taking over from night shift and that SW will provide updates as to bed availability. Patient acknowledged and continued to rest laying on her stomach.       WESTLEY Miguel  12/26/22 6515

## 2022-12-26 NOTE — ED NOTES
Pt alert, resting on stretcher. Sitter at bedside. Pt refused vital signs. Resident ntfd. Pt provided warm blankets.       Gia Soto RN  12/26/22 8589

## 2022-12-26 NOTE — ED NOTES
Pt becoming upset stating she wants to sign out AMA. Writer explained pt cannot do so d/t her risk of harm to herself. Pt requesting to speak with NICOLE and my boss.  NICOLE Santa and Dr. Tiesha Ayala notified     Zuleyma Saldana RN  12/26/22 9945

## 2022-12-26 NOTE — ED NOTES
SW was asked to speak to pt by RN as pt is wanting to leave. Sw explained that pt was put on a pink slip by Dr. Akira Hahn and that pt is unable to leave at this time and writer is trying to work on placement at the pt's requested facility. Pt became upset and wanted to speak to supervisor. NICOLE contacted Attending Physician Dr. Melvina Pickens who explained to pt that she is unable to sign herself out at this time as pt was placed on a pink slip. [employer] notified of pt's possibility of trying to elope. Pt has one on one sitter present. Pt placed in safer area.       Kishore Leon, W  12/25/22 Abbie 150, W  12/26/22 0134

## 2022-12-26 NOTE — ED NOTES
NICOLE attempted to present case to [place] [address]. Per Wale at [facility] pt needs covid test since pt is requesting [address] will call back to present case again once test is resulted.       [facility]  12/25/22 2205

## 2022-12-26 NOTE — ED PROVIDER NOTES
[facility]  ED  Emergency Department  Emergency Medicine Resident Sign-out     Care of Lazara Leiva was assumed from Dr. Cartwright Friend and is being seen for Suicide Attempt (Took 10 xanax about an hour ago, denies SI or HI at this time, stating she just wants to sleep)  . The patient's initial evaluation and plan have been discussed with the prior provider who initially evaluated the patient. EMERGENCY DEPARTMENT COURSE / MEDICAL DECISION MAKING:       MEDICATIONS GIVEN:  Orders Placed This Encounter   Medications    DISCONTD: charcoal activated liquid 50 g    DISCONTD: charcoal activated liquid 50 g    charcoal (ACTIDOSE/SORBITOL) solution 50 g       LABS / RADIOLOGY:     Labs Reviewed   CBC WITH AUTO DIFFERENTIAL - Abnormal; Notable for the following components:       Result Value    Hemoglobin 9.1 (*)     Hematocrit 31.7 (*)     MCV 65.0 (*)     MCH 18.6 (*)     RDW 17.2 (*)     Immature Granulocytes 1 (*)     All other components within normal limits   TOX SCR, BLD, ED - Abnormal; Notable for the following components:    Acetaminophen Level <5 (*)     Salicylate Lvl <1 (*)     All other components within normal limits   COMPREHENSIVE METABOLIC PANEL - Abnormal; Notable for the following components:    Glucose 146 (*)     Calcium 8.5 (*)     ALT 41 (*)     Total Protein 6.1 (*)     All other components within normal limits   URINE DRUG SCREEN - Abnormal; Notable for the following components:    Benzodiazepine Screen, Urine POSITIVE (*)     Oxycodone Screen, Ur POSITIVE (*)     All other components within normal limits   COVID-19, RAPID   HCG, SERUM, QUALITATIVE   IMMATURE PLATELET FRACTION       No results found. RECENT VITALS:     Temp: 98.7 °F (37.1 °C),  Heart Rate: 95, Resp: 14, BP: 126/79, SpO2: 100 %      This patient is a 46 y.o. Female with suicide attempt with ingestion of 10 Xanax; medically cleared after 4hrs observation. Received activated charcoal. [place] slipped.  VSS, in no acute distress. Patient accepted to [facility] but the unit is full. Awaiting bed availability and transfer. ED Course as of 12/26/22 0219   Syeda Scott Dec 25, 2022   1721 Patient value bedside. Suicidal ideation with attempt. Took 10 Xanax approximately 1 hour ago. They are not hers unclear on the dose. No acute distress asymptomatic at this time. Patient maintaining airway. Will get labs, EKG contact poison control and social work [ZE]   [id number] with poison control they agree with her work-up and plan. Will give activated charcoal.  Patient maintaining her airway. Plan to monitor for 4 to 6 hours and continue supportive care [ZE]   1807 Patient currently refusing charcoal she is ANO x4 with decision-making capacity. We will continue to try to get patient to take charcoal to prevent symptomatic benzodiazepine toxicity. [ZE]   1938 Patient's lab work largely unremarkable. She is positive for benzodiazepines and oxycodone. Currently no acute distress VSS maintaining airway. We will continue to monitor. Plan to watch patient till 2130 [ZE]   2138 Patient is medically cleared for transfer to [facility]. Has been observed for over 4 hours in the emergency department and 5 hours since ingestion without any clinical signs or symptoms of airway compromise vital instability or benzodiazepine toxicity [ZE]   2304 Patient reeval at bedside. Nontoxic. Patient states she does not think that she needs inpatient psychiatric treatment. Patient refusing to go to inpatient psychiatric treatment. Will pink slip patient. Patient with suicide attempt is high risk for repeat if she goes home safest option is to pink slip patient to make sure she gets adequate psychiatric care inpatient [ZE]   Mon Dec 26, 2022   0016 Patient reevaluated bedside. No acute distress. Patient still very upset that she was pink slipped. Patient called 911 on the hospital stating that we were holding her against her will.   Patient again told about process that is in place for her safety so she can be provided psychiatric inpatient care [ZE]   4762 Patient declined at [facility] but has been accepted at [facility] no beds available tonight reassess for possible bed placement tomorrow. [ZE]   0151 Patient no acute distress moving all extremities symmetric chest wall rise eyes open when talking. Currently refusing vitals [ZE]   0213 Signed out to PM resident [ZE]      ED Course User Index  [ZE] James Winter DO       OUTSTANDING TASKS / RECOMMENDATIONS:    Awaiting bed availability at [facility]     FINAL IMPRESSION:     1. Suicide attempt ([facility] [address]        DISPOSITION:         DISPOSITION:  []  Discharge   [x]  Transfer - [facility]   []  Admission -     []  Against Medical Advice   []  Eloped   FOLLOW-UP: No follow-up provider specified.    DISCHARGE MEDICATIONS: New Prescriptions    No medications on file          Yulisa Jeffrey MD  Emergency Medicine Resident  [facility]      Yulisa Jeffrey MD  Resident  12/26/22 0472

## 2022-12-26 NOTE — ED NOTES
SW updated patient that [facility] is still discharge dependent. Will notify her when bed is available.      Joanie Smith, FLOYD  12/26/22 8031

## 2022-12-26 NOTE — ED PROVIDER NOTES
[facility] ED  Emergency Department  Emergency Medicine Resident Sign-out     Care of Gabi Baird was assumed from Dr. Paieg Horn and is being seen for Suicide Attempt (Took 10 xanax about an hour ago, denies SI or HI at this time, stating she just wants to sleep)  . The patient's initial evaluation and plan have been discussed with the prior provider who initially evaluated the patient. EMERGENCY DEPARTMENT COURSE / MEDICAL DECISION MAKING:       MEDICATIONS GIVEN:  Orders Placed This Encounter   Medications    DISCONTD: charcoal activated liquid 50 g    DISCONTD: charcoal activated liquid 50 g    charcoal (ACTIDOSE/SORBITOL) solution 50 g    acetaminophen (TYLENOL) tablet 1,000 mg       LABS / RADIOLOGY:     Labs Reviewed   CBC WITH AUTO DIFFERENTIAL - Abnormal; Notable for the following components:       Result Value    Hemoglobin 9.1 (*)     Hematocrit 31.7 (*)     MCV 65.0 (*)     MCH 18.6 (*)     RDW 17.2 (*)     Immature Granulocytes 1 (*)     All other components within normal limits   TOX SCR, BLD, ED - Abnormal; Notable for the following components:    Acetaminophen Level <5 (*)     Salicylate Lvl <1 (*)     All other components within normal limits   COMPREHENSIVE METABOLIC PANEL - Abnormal; Notable for the following components:    Glucose 146 (*)     Calcium 8.5 (*)     ALT 41 (*)     Total Protein 6.1 (*)     All other components within normal limits   URINE DRUG SCREEN - Abnormal; Notable for the following components:    Benzodiazepine Screen, Urine POSITIVE (*)     Oxycodone Screen, Ur POSITIVE (*)     All other components within normal limits   COVID-19, RAPID   HCG, SERUM, QUALITATIVE   IMMATURE PLATELET FRACTION       No results found. RECENT VITALS:     Temp: 98.7 °F (37.1 °C),  Heart Rate: 84, Resp: 19, BP: 139/81, SpO2: 95 %      This patient is a 46 y.o. Female status post suicide attempt with intentional overdose with Xanax.   Received activated charcoal on arrival.  Patient is awake, alert, breathing normally and no signs of respiratory depression. Patient has been pink slipped and medically cleared for transfer to [facility]. Currently awaiting bed. ED Course as of 12/26/22 1414   Sun Dec 25, 2022   1721 Patient value bedside. Suicidal ideation with attempt. Took 10 Xanax approximately 1 hour ago. They are not hers unclear on the dose. No acute distress asymptomatic at this time. Patient maintaining airway. Will get labs, EKG contact poison control and social work [ZE]   [id number] with poison control they agree with her work-up and plan. Will give activated charcoal.  Patient maintaining her airway. Plan to monitor for 4 to 6 hours and continue supportive care [ZE]   1807 Patient currently refusing charcoal she is ANO x4 with decision-making capacity. We will continue to try to get patient to take charcoal to prevent symptomatic benzodiazepine toxicity. [ZE]   1938 Patient's lab work largely unremarkable. She is positive for benzodiazepines and oxycodone. Currently no acute distress VSS maintaining airway. We will continue to monitor. Plan to watch patient till 2130 [ZE]   2138 Patient is medically cleared for transfer to [facility]. Has been observed for over 4 hours in the emergency department and 5 hours since ingestion without any clinical signs or symptoms of airway compromise vital instability or benzodiazepine toxicity [ZE]   2304 Patient reeval at bedside. Nontoxic. Patient states she does not think that she needs inpatient psychiatric treatment. Patient refusing to go to inpatient psychiatric treatment. Will pink slip patient. Patient with suicide attempt is high risk for repeat if she goes home safest option is to pink slip patient to make sure she gets adequate psychiatric care inpatient [ZE]   Mon Dec 26, 2022   0016 Patient reevaluated bedside. No acute distress. Patient still very upset that she was pink slipped.   Patient called 911 on the hospital stating that we were holding her against her will. Patient again told about process that is in place for her safety so she can be provided psychiatric inpatient care [ZE]   4182 Patient declined at [facility] but has been accepted at [facility] no beds available tonight reassess for possible bed placement tomorrow. [ZE]   0151 Patient no acute distress moving all extremities symmetric chest wall rise eyes open when talking. Currently refusing vitals [ZE]   0213 Signed out to PM resident [ZE]      ED Course User Index  [ZE] Eitan Limon,        OUTSTANDING TASKS / RECOMMENDATIONS:    Awaiting open bed in [facility]     FINAL IMPRESSION:     1. Suicide attempt ([address]        DISPOSITION:         DISPOSITION:  []  Discharge   [x]  Transfer - [facility]   []  Admission -     []  Against Medical Advice   []  Eloped   FOLLOW-UP: No follow-up provider specified.    DISCHARGE MEDICATIONS: New Prescriptions    No medications on file          Franklin Trimble MD  Emergency Medicine Resident  [facility]       Franklin Trimble MD  Resident  12/26/22 1659

## 2022-12-26 NOTE — ED NOTES
Report given to [facility], all questions answered. Pt moved to [facility] d/t flight risk.  Sitter remains at bedside     Onur Solis RN  12/26/22 9390

## 2022-12-26 NOTE — ED NOTES
Writer received a call from RN at [facility] stating patient had a room assigned 135 bed 2.       WESTLEY Prado  12/26/22 9714

## 2022-12-26 NOTE — ED NOTES
NICOLE reached out to [facility] to confirm they received transport fax. Teressa from [employer] confirmed receiving transport and was informed that pt is a flight risk.       FLOYD Hills  12/26/22 0134

## 2022-12-26 NOTE — ED PROVIDER NOTES
[facility] HANDOFF       Handoff taken on the following patient from prior Attending Physician:  Pt Name: Nichole Schilder  PCP:  Ronen Overall, APRN - NP    Attestation  I was available and discussed any additional care issues that arose and coordinated the management plans with the resident(s) caring for the patient during my duty period. Any areas of disagreement with resident's documentation of care or procedures are noted on the chart. I was personally present for the key portions of any/all procedures during my duty period. I have documented in the chart those procedures where I was not present during the key portions. CHIEF COMPLAINT       Chief Complaint   Patient presents with    Suicide Attempt     Took 10 xanax about an hour ago, denies SI or HI at this time, stating she just wants to sleep         CURRENT MEDICATIONS     Previous Medications  Previous Medications    CALCIUM-CHOLECALCIFEROL (CALCIUM 500+D) 500-200 MG-UNIT PER TABLET    Take 1 tablet by mouth daily    CHLORZOXAZONE (PARAFON FORTE) 750 MG TABS TABLET    Take 750 mg by mouth 3 times daily     CYANOCOBALAMIN (VITAMIN B12 PO)    Take 1 tablet by mouth daily    ESTROGENS, CONJUGATED, (PREMARIN) 0.3 MG TABLET    Take 0.3 mg by mouth daily    FOLIC ACID PO    Take 1 tablet by mouth daily    LIDOCAINE (ZTLIDO) 1.8 % PTCH    Apply 1 patch topically daily To leg    LISINOPRIL (PRINIVIL;ZESTRIL) 5 MG TABLET    Take 7.5 mg by mouth daily 1.5 tabs (=7.5mg) daily    LORATADINE (CLARITIN) 10 MG TABLET    Take 1 tablet by mouth daily as needed     OMEPRAZOLE (PRILOSEC) 20 MG DELAYED RELEASE CAPSULE    Take 1 capsule by mouth 2 times daily    OXYCODONE-ACETAMINOPHEN (PERCOCET)  MG PER TABLET    Take 1 tablet by mouth every 6 hours as needed for Pain.      PROGESTERONE (PROMETRIUM) 100 MG CAPSULE    Take 100 mg by mouth daily       Encounter Medications  Orders Placed This Encounter   Medications    DISCONTD: charcoal activated liquid 50 g    DISCONTD: charcoal activated liquid 50 g    charcoal (ACTIDOSE/SORBITOL) solution 50 g       ALLERGIES     is allergic to coconut fragrance and pcn [penicillins]. RECENT VITALS:   Temp: 98.7 °F (37.1 °C),  Heart Rate: 84, Resp: 19, BP: 139/81    RADIOLOGY:   No orders to display       LABS:  Labs Reviewed   CBC WITH AUTO DIFFERENTIAL - Abnormal; Notable for the following components:       Result Value    Hemoglobin 9.1 (*)     Hematocrit 31.7 (*)     MCV 65.0 (*)     MCH 18.6 (*)     RDW 17.2 (*)     Immature Granulocytes 1 (*)     All other components within normal limits   TOX SCR, BLD, ED - Abnormal; Notable for the following components:    Acetaminophen Level <5 (*)     Salicylate Lvl <1 (*)     All other components within normal limits   COMPREHENSIVE METABOLIC PANEL - Abnormal; Notable for the following components:    Glucose 146 (*)     Calcium 8.5 (*)     ALT 41 (*)     Total Protein 6.1 (*)     All other components within normal limits   URINE DRUG SCREEN - Abnormal; Notable for the following components:    Benzodiazepine Screen, Urine POSITIVE (*)     Oxycodone Screen, Ur POSITIVE (*)     All other components within normal limits   COVID-19, RAPID   HCG, SERUM, QUALITATIVE   IMMATURE PLATELET FRACTION           PLAN/ TASKS OUTSTANDING     Patient pink slipped, awaiting transfer to [facility] which is discharge dependent. (Please note that portions of this note were completed with a voice recognition program.  Efforts were made to edit the dictations but occasionally words are mis-transcribed. )    Jaqui Jefferson MD, MD,   Attending Emergency Physician        Cecile Jackson MD  12/26/22 9236

## 2022-12-26 NOTE — BH NOTE
[facility]  Admission Note     Admission Type:   Admission Type: Involuntary    Reason for admission:  Reason for Admission: OD on 10 xanax      Addictive Behavior:   Addictive Behavior  In the Past 3 Months, Have You Felt or Has Someone Told You That You Have a Problem With  : Other (comment) (patient refused)    Medical Problems:   Past Medical History:   Diagnosis Date    H/O sickle cell trait     History of blood transfusion     Hypertension        Status EXAM:  Mental Status and Behavioral Exam  Normal: No (anxious)  Level of Assistance: Independent/Self  Facial Expression: Avoids Gaze  Affect: Appropriate  Level of Consciousness: Alert  Frequency of Checks: 4 times per hour, close  Mood:Normal: No  Mood: Anxious, Suspicious, Angry  Motor Activity:Normal: Yes  Eye Contact: Poor  Observed Behavior: Agitated, Withdrawn, Guarded  Sexual Misconduct History: Current - no  Preception: [place] to person, [place] to time, [place] to place, [place] to situation  Attention:Normal: No  Attention: Hyperalert  Thought Processes: Circumstantial  Thought Content:Normal: No  Thought Content: Paranoia, Preoccupations  Depression Symptoms: Increased irritability  Anxiety Symptoms: Generalized  Jeri Symptoms: No problems reported or observed.   Hallucinations: Unable to assess  Delusions: No  Memory:Normal: No  Memory: Poor recent, Poor remote  Insight and Judgment: No  Insight and Judgment: Poor judgment, Poor insight    Tobacco Screening:  Practical Counseling, on admission, sushil X, if applicable and completed (first 3 are required if patient doesn't refuse):            ( ) Recognizing danger situations (included triggers and roadblocks)                    ( ) Coping skills (new ways to manage stress,relaxation techniques, changing routine, distraction)                                                           ( ) Basic information about quitting (benefits of quitting, techniques in how to quit, available resources  ( ) Referral for counseling faxed to [facility]                                                                                                                   (x) Patient refused counseling  ( ) Patient has not smoked in the last 30 days    Metabolic Screening:    No results found for: LABA1C    No results found for: CHOL  No results found for: TRIG  No results found for: HDL  No components found for: LDLCAL  No results found for: LABVLDL      Body mass index is 31.09 kg/m². BP Readings from Last 2 Encounters:   12/26/22 139/81   02/02/20 (!) 122/54     Pinked from [facility] for OD on 10 xanax. Refusing to sign, vitals, assessment. Risk of elopement, wants to leave. Patient closely watched. Pt admitted with followings belongings:  Dental Appliances: None  Vision - Corrective Lenses: None  Hearing Aid: None  Jewelry: None  Body Piercings Removed: N/A  Clothing:  Footwear, Pants, Jacket/Coat  Other Valuables: Wallet, Purse, Keys, Other (Comment) (license [employer] card, $283.00)    Chris Dorsey RN

## 2022-12-26 NOTE — ED NOTES
Pt alert, denying SI/HI. Pt demanding to go home. Sitter remains at bedside.           Shiv Grossman RN  12/26/22 6972

## 2022-12-26 NOTE — ED NOTES
Writer made telephone call to [address] [place] [address] [place]. Writer spoke with [employer]. ETA for patient  is 45 minutes.       WESTLEY Lux  12/26/22 4710

## 2022-12-26 NOTE — ED NOTES
Vitals charted, pt is currently sleeping on ER cot, no complaints at this time.  1:1 suicide watch maintained by FATUMA Lassiter  12/26/22 2720

## 2022-12-26 NOTE — ED NOTES
The following labs were labeled with appropriate pt sticker and tubed to lab:     [] Blue     [] Lavender   [] on ice  [] Green/yellow  [] Green/black [] on ice  [] Li [place]  [] on ice  [] Yellow  [] Red  [] Type/ Screen  [] ABG  [] VBG    [] COVID-19 swab    [] Rapid  [] PCR  [] Flu swab  [] Peds Viral Panel     [x] Urine Sample  [] Fecal Sample  [] Pelvic Cultures  [] Blood Cultures  [] X 2  [] STREP Cultures         Ricco Singleton RN  12/25/22 1916

## 2022-12-26 NOTE — ED NOTES
SW called [facility] back with report that pt's covid test resulted. Waiting to hear back if pt is accepted.       FLOYD Olivares  12/25/22 7918

## 2022-12-27 PROBLEM — E66.09 CLASS 1 OBESITY DUE TO EXCESS CALORIES WITH SERIOUS COMORBIDITY AND BODY MASS INDEX (BMI) OF 31.0 TO 31.9 IN ADULT: Status: ACTIVE | Noted: 2022-12-27

## 2022-12-27 PROBLEM — F32.2 MAJOR DEPRESSIVE DISORDER, SINGLE EPISODE, SEVERE WITHOUT PSYCHOSIS (HCC): Status: ACTIVE | Noted: 2022-12-27

## 2022-12-27 PROBLEM — R73.9 HYPERGLYCEMIA: Status: ACTIVE | Noted: 2022-12-27

## 2022-12-27 PROBLEM — D63.8 ANEMIA OF CHRONIC DISEASE: Status: ACTIVE | Noted: 2022-12-27

## 2022-12-27 PROCEDURE — 6370000000 HC RX 637 (ALT 250 FOR IP)

## 2022-12-27 PROCEDURE — 99223 1ST HOSP IP/OBS HIGH 75: CPT | Performed by: INTERNAL MEDICINE

## 2022-12-27 PROCEDURE — 6370000000 HC RX 637 (ALT 250 FOR IP): Performed by: PSYCHIATRY & NEUROLOGY

## 2022-12-27 PROCEDURE — 1240000000 HC EMOTIONAL WELLNESS R&B

## 2022-12-27 PROCEDURE — APPSS60 APP SPLIT SHARED TIME 46-60 MINUTES

## 2022-12-27 RX ORDER — PANTOPRAZOLE SODIUM 40 MG/1
40 TABLET, DELAYED RELEASE ORAL
Status: CANCELLED | OUTPATIENT
Start: 2022-12-27

## 2022-12-27 RX ORDER — HYDROXYZINE 50 MG/1
50 TABLET, FILM COATED ORAL 3 TIMES DAILY PRN
Status: DISCONTINUED | OUTPATIENT
Start: 2022-12-27 | End: 2022-12-29 | Stop reason: HOSPADM

## 2022-12-27 RX ORDER — CYCLOBENZAPRINE HCL 10 MG
10 TABLET ORAL ONCE
Status: COMPLETED | OUTPATIENT
Start: 2022-12-27 | End: 2022-12-27

## 2022-12-27 RX ORDER — FOLIC ACID 1 MG/1
1 TABLET ORAL DAILY
Status: CANCELLED | OUTPATIENT
Start: 2022-12-27

## 2022-12-27 RX ORDER — CETIRIZINE HYDROCHLORIDE 10 MG/1
10 TABLET ORAL DAILY
Status: CANCELLED | OUTPATIENT
Start: 2022-12-27

## 2022-12-27 RX ORDER — UBIDECARENONE 75 MG
100 CAPSULE ORAL DAILY
Status: CANCELLED | OUTPATIENT
Start: 2022-12-27

## 2022-12-27 RX ORDER — LISINOPRIL 5 MG/1
7.5 TABLET ORAL DAILY
Status: CANCELLED | OUTPATIENT
Start: 2022-12-27

## 2022-12-27 RX ADMIN — IBUPROFEN 400 MG: 400 TABLET, FILM COATED ORAL at 20:27

## 2022-12-27 RX ADMIN — CYCLOBENZAPRINE 10 MG: 10 TABLET, FILM COATED ORAL at 21:48

## 2022-12-27 RX ADMIN — HYDROXYZINE HYDROCHLORIDE 50 MG: 50 TABLET ORAL at 22:24

## 2022-12-27 RX ADMIN — HYDROXYZINE HYDROCHLORIDE 50 MG: 50 TABLET ORAL at 01:59

## 2022-12-27 RX ADMIN — TRAZODONE HYDROCHLORIDE 50 MG: 50 TABLET ORAL at 22:24

## 2022-12-27 RX ADMIN — ACETAMINOPHEN 650 MG: 325 TABLET ORAL at 18:27

## 2022-12-27 ASSESSMENT — PAIN DESCRIPTION - ORIENTATION
ORIENTATION: RIGHT;LEFT
ORIENTATION: RIGHT;LEFT

## 2022-12-27 ASSESSMENT — PAIN SCALES - GENERAL
PAINLEVEL_OUTOF10: 0
PAINLEVEL_OUTOF10: 6
PAINLEVEL_OUTOF10: 7
PAINLEVEL_OUTOF10: 4
PAINLEVEL_OUTOF10: 7
PAINLEVEL_OUTOF10: 7

## 2022-12-27 ASSESSMENT — PAIN - FUNCTIONAL ASSESSMENT: PAIN_FUNCTIONAL_ASSESSMENT: ACTIVITIES ARE NOT PREVENTED

## 2022-12-27 ASSESSMENT — PAIN DESCRIPTION - LOCATION
LOCATION: BACK;LEG
LOCATION: BACK;FOOT
LOCATION: BACK;LEG

## 2022-12-27 ASSESSMENT — PAIN DESCRIPTION - DESCRIPTORS: DESCRIPTORS: ACHING

## 2022-12-27 NOTE — GROUP NOTE
Group Therapy Note    Date: 12/27/2022    Group Start Time: 0900  Group End Time: 0915  Group Topic: Community Meeting    CZ BHCAREY C    [facility] Meeting Group Note        Date: 12/27/2022  Start Time: 9am  End Time:  0915      Number of Participants in Group & Unit Census:  6/23      Goal of Group: To discuss daily goals      Comments:     Patient did not participate in [employer] group, despite staff encouragement and explanation of benefits. Patient remain seclusive to self. Q15 minute safety checks maintained for patient safety and will continue to encourage patient to attend unit programming.

## 2022-12-27 NOTE — H&P
[facility]  Twanna Phalen, MD; Prabhakar Harp MD; Abbey Pérez MD; MD Chrissy Lemon MD; MD [facility]   [address] / HISTORY AND PHYSICAL EXAMINATION            Date:   2022  Patient name:  Caitlin Montelongo  Date of admission:  2022  3:05 PM  MRN:   [id number]  Account:  [de-identified]  YOB: 1970  PCP:    CHRISTOPHER Travis NP  Room:   [address] [place] [address]  Code Status:    Full Code    Physician Requesting Consult: Arely Ambrose MD    Reason for Consult: History and physical examination    Chief Complaint:     No chief complaint on file. Suicidal ideations    History Obtained From:     patient    History of Present Illness:     51-year-old female with underlying history of anxiety and depression, hypertension, sickle cell trait, anemia, history of blood transfusion, admitted to inpatient psych for suicidal ideations    Past Medical History:     Past Medical History:   Diagnosis Date    H/O sickle cell trait     History of blood transfusion     Hypertension         Past Surgical History:     Past Surgical History:   Procedure Laterality Date     SECTION  4965,3255    X2    COLONOSCOPY      DILATION AND CURETTAGE OF UTERUS      ECTOPIC PREGNANCY SURGERY      KNEE ARTHROSCOPY Left     KNEE ARTHROSCOPY Left 07/01/15    UPPER GASTROINTESTINAL ENDOSCOPY          Medications Prior to Admission:     Prior to Admission medications    Medication Sig Start Date End Date Taking?  Authorizing Provider   omeprazole (PRILOSEC) 20 MG delayed release capsule Take 1 capsule by mouth 2 times daily 2/3/20   Erin Schroeder MD   chlorzoxazone (PARAFON FORTE) 750 MG TABS tablet Take 750 mg by mouth 3 times daily     Historical Provider, MD   calcium-cholecalciferol (CALCIUM 500+D) 500-200 MG-UNIT per tablet Take 1 tablet by mouth daily Historical Provider, MD   Lidocaine (ZTLIDO) 1.8 % PTCH Apply 1 patch topically daily To leg    Historical Provider, MD   progesterone (PROMETRIUM) 100 MG capsule Take 100 mg by mouth daily    Historical Provider, MD   estrogens, conjugated, (PREMARIN) 0.3 MG tablet Take 0.3 mg by mouth daily    Historical Provider, MD   Cyanocobalamin (VITAMIN B12 PO) Take 1 tablet by mouth daily    Historical Provider, MD   FOLIC ACID PO Take 1 tablet by mouth daily    Historical Provider, MD   lisinopril (PRINIVIL;ZESTRIL) 5 MG tablet Take 7.5 mg by mouth daily 1.5 tabs (=7.5mg) daily    Historical Provider, MD   oxyCODONE-acetaminophen (PERCOCET)  MG per tablet Take 1 tablet by mouth every 6 hours as needed for Pain. Historical Provider, MD   loratadine (CLARITIN) 10 MG tablet Take 1 tablet by mouth daily as needed     Historical Provider, MD        Allergies:     Coconut fragrance and Pcn [penicillins]    Social History:     Tobacco:    reports that she has never smoked. She has never used smokeless tobacco.  Alcohol:      reports current alcohol use. Drug Use:  reports no history of drug use. Family History:     Family History   Problem Relation Age of Onset    Diabetes Father        Review of Systems:     Positive and Negative as described in HPI. CONSTITUTIONAL:  negative for fevers, chills, sweats, fatigue, weight loss  HEENT:  negative for vision, hearing changes, runny nose, throat pain  RESPIRATORY:  negative for shortness of breath, cough, congestion, wheezing. CARDIOVASCULAR:  negative for chest pain, palpitations.   GASTROINTESTINAL:  negative for nausea, vomiting, diarrhea, constipation, change in bowel habits, abdominal pain   GENITOURINARY:  negative for difficulty of urination, burning with urination, frequency   INTEGUMENT:  negative for rash, skin lesions, easy bruising   HEMATOLOGIC/LYMPHATIC:  negative for swelling/edema   ALLERGIC/IMMUNOLOGIC:  negative for urticaria , itching  ENDOCRINE: negative increase in drinking, increase in urination, hot or cold intolerance  MUSCULOSKELETAL:  negative joint pains, muscle aches, swelling of joints  NEUROLOGICAL:  negative for headaches, dizziness, lightheadedness, numbness, pain, tingling extremities    Physical Exam:     /75   Pulse 92   Temp 98.2 °F (36.8 °C) (Oral)   Resp 14   Wt 170 lb (77.1 kg)   BMI 31.09 kg/m²   Temp (24hrs), Av.2 °F (36.8 °C), Min:98.2 °F (36.8 °C), Max:98.2 °F (36.8 °C)    No results for input(s): POCGLU in the last 72 hours. No intake or output data in the 24 hours ending 22 1229    General Appearance:  alert, well appearing, and in no acute distress  Mental status: oriented to person, place, and time with normal affect  Head:  normocephalic, atraumatic. Eye: no icterus, redness, pupils equal and reactive, extraocular eye movements intact, conjunctiva clear  Ear: normal external ear, no discharge, hearing intact  Nose:  no drainage noted  Mouth: mucous membranes moist  Neck: supple, no carotid bruits, thyroid not palpable  Lungs: Bilateral equal air entry, clear to ausculation, no wheezing, rales or rhonchi, normal effort  Cardiovascular: normal rate, regular rhythm, no murmur, gallop, rub. Abdomen: Soft, nontender, nondistended, normal bowel sounds, no hepatomegaly or splenomegaly  Neurologic: There are no new focal motor or sensory deficits, normal muscle tone and bulk, no abnormal sensation, normal speech, cranial nerves II through XII grossly intact  Skin: No gross lesions, rashes, bruising or bleeding on exposed skin area  Extremities:  peripheral pulses palpable, no pedal edema or calf pain with palpation    Investigations:      Laboratory Testing:  No results found for this or any previous visit (from the past 24 hour(s)). Imaging/Diagonstics:  No results found.     Assessment :      Hospital Problems             Last Modified POA    * (Principal) Major depressive disorder, single episode, severe without psychosis ([facility] 12/27/2022 Yes    Depression with suicidal ideation 12/27/2022 Yes    Class 1 obesity due to excess calories with serious comorbidity and body mass index (BMI) of 31.0 to 31.9 in adult 12/27/2022 Yes    Anemia of chronic disease 12/27/2022 Yes    Hyperglycemia 12/27/2022 Yes    Essential hypertension 12/27/2022 Yes    Sickle cell trait ([facility].) 12/27/2022 Yes       Plan:     44-year-old female with underlying history of anxiety and depression admitted to inpatient psych for severe psychosis and suicidal ideations,  Depression with suicidal ideations, meds per psych,  Obesity, low-calorie diet and lifestyle modification advised  Anemia of chronic disease, hemoglobin 9.1, monitor hemoglobin,  Hypertension, continue to monitor blood pressure, will restart home medications,  Sickle cell trait,  Hyperglycemia, will check A1c,  DVT prophylaxis, patient mobile,  Full CODE STATUS    Consultations:   Teo Andrade MD  12/27/2022  12:29 PM    Copy sent to Dr. Kamlesh Negron, APRN - NP    Please note that this chart was generated using voice recognition Dragon dictation software. Although every effort was made to ensure the accuracy of this automated transcription, some errors in transcription may have occurred.

## 2022-12-27 NOTE — BH NOTE
[facility]  Initial Interdisciplinary Treatment Plan NO      Original treatment plan Date & Time: 12/27/22 13:00    Admission Type:  Admission Type: Involuntary    Reason for admission:   Reason for Admission: OD on 10 xanax    Estimated Length of Stay:  5-7days  Estimated Discharge Date: to be determined by physician    PATIENT STRENGTHS:  Patient Strengths:   Patient Strengths and Limitations:   Addictive Behavior: Addictive Behavior  In the Past 3 Months, Have You Felt or Has Someone Told You That You Have a Problem With  : Other (comment) (patient refused)  Medical Problems:  Past Medical History:   Diagnosis Date    H/O sickle cell trait     History of blood transfusion     Hypertension      Status EXAM:Mental Status and Behavioral Exam  Normal: No  Level of Assistance: Independent/Self  Facial Expression: Avoids Gaze  Affect: Blunt  Level of Consciousness: Alert  Frequency of Checks: 4 times per hour, close  Mood:Normal: No  Mood: Angry, Irritable  Motor Activity:Normal: Yes  Eye Contact: Fair  Observed Behavior: Agitated  Sexual Misconduct History: Current - no  Preception: [place] to person, [place] to time, [place] to place, [place] to situation  Attention:Normal: No  Attention: Unable to concentrate  Thought Processes: Circumstantial  Thought Content:Normal: No  Thought Content: Preoccupations  Depression Symptoms: Increased irritability  Anxiety Symptoms: Generalized  Jeri Symptoms: No problems reported or observed.   Hallucinations: None  Delusions: No  Memory:Normal: No  Memory: Poor recent, Poor remote  Insight and Judgment: No  Insight and Judgment: Unrealistic, Poor insight, Poor judgment    EDUCATION:   Learner Progress Toward Treatment Goals: reviewed group plans and strategies for care    Method:group therapy, medication compliance, individualized assessments and care planning    Outcome: needs reinforcement    PATIENT GOALS: to be discussed with patient within 67 hours    PLAN/TREATMENT RECOMMENDATIONS:     continue group therapy , medications compliance, goal setting, individualized assessments and care, continue to monitor pt on unit      SHORT-TERM GOALS:   Time frame for Short-Term Goals: 5-7 days    Patient declined to attend.    LONG-TERM GOALS:  Time frame for Long-Term Goals: 6 months  Members Present in Team Meeting: See Signature Sheet    Karishma Noble RN

## 2022-12-27 NOTE — CARE COORDINATION
BHI Biopsychosocial Assessment    Current Level of Psychosocial Functioning     Independent xx  Dependent    Minimal Assist     Comments:    Psychosocial High Risk Factors (check all that apply)    Unable to obtain meds  xx  Chronic illness/pain  xx  Substance abuse xx  Lack of Family Support   Financial stress xx  Isolation   Inadequate Community Resources xx  Suicide attempt(s) xx  Not taking medications  xx  Victim of crime   Developmental Delay  Unable to manage personal needs    Age 72 or older   Homeless  No transportation   Readmission within 30 days  Unemployment  Traumatic Event    Comments:   Psychiatric Advanced Directives: none reported     Family to Involve in Treatment: pt daughter Ann Kuhn is supportive     Sexual Orientation:  n/a    Patient Strengths: pt is employed at [employer], has stable housing     Patient Barriers: pt has history of hospitalization and substance abuse treatment, has financial challenges due to being on leave from her job       Opiate Education Provided:  pt denies       CMHC/mental health history: pt states she is not linked with outpatient provider     Plan of Care   medication management, group/individual therapies, family meetings, psycho -education, treatment team meetings to assist with stabilization    Initial Discharge Plan:  Pt states she will return home at discharge. Clinical Summary:  Edouard Saunders is a 46year old  female who has been admitted to [address] [place] [address] following intentional overdose on benzodiazepines. Sw me with pt who was observed as guarded and suspicious thoughout interaction. Pt is circumstantial to discharge, states she has a  to attend tomorrow,  for the Godfather of her youngest daughter Ann Kuhn. SW offered validation of her feelings, support. Pt signed DAMARIS for daughter Ann Kuhn [phone].  SW spoke with Ann Kuhn who spoke with SW under the condition that SW would not relay information told to SW to patient, Ann Kuhn states Marian Thao will just use all this against all of us and be mad at us. \" Brian Martinez states she and her 3 siblings have been financially supporting patient during times when she is on leave from her job at [employer], Brian Martinez states her frustration of being the youngest child (25) and having \"constant issues\" with pt and her mental health and substance abuse concerns. Brian Martinez states pt recently was discharged early from a [address] day AOD treatment program, Brian Martinez states she sometimes speaks with her mom via phone \"and I know she's been taking pills\" due to slurring of words and having no memory of conversations with her. Brian Martinez states her Godfather's  is tomorrow but is not sure whether/not it is formidable to pt to attend. Rockwall Mulmiky states she is working on boundaries with pt as she is a new graduate, starting a new job and is setting boundaries with pt. Brian Martinez states pt is \"angry\" because Nilsa's father won a custody ochoa and she was raised by her father primarily, states pt is grieving for pt father who  6 years ago. SW offered supportive listening, future support as needed.

## 2022-12-27 NOTE — PLAN OF CARE
12/27/22  Problem: Anxiety  Goal: Will report anxiety at manageable levels  Description: INTERVENTIONS:  1. Administer medication as ordered  2. Teach and rehearse alternative coping skills  3. Provide emotional support with 1:1 interaction with staff  Outcome: Progressing  Flowsheets  Taken 12/27/2022 1112  Will report anxiety at manageable levels:   Administer medication as ordered   Teach and rehearse alternative coping skills   Provide emotional support with 1:1 interaction with staff  Taken 12/27/2022 1106  Will report anxiety at manageable levels:   Administer medication as ordered   Teach and rehearse alternative coping skills   Provide emotional support with 1:1 interaction with staff  Taken 12/27/2022 1105  Will report anxiety at manageable levels:   Administer medication as ordered   Teach and rehearse alternative coping skills   Provide emotional support with 1:1 interaction with staff   Exhibiting anxiety, moved to private room. Offered medications but patient refuses. Encouraged1:1 talk time with staff. Pt is agitated today and refuses.

## 2022-12-27 NOTE — GROUP NOTE
Psych-Ed/Relapse Prevention Group Note        Date: December 27, 2022 Start Time: 1:30pm  End Time:  2:10pm      Number of Participants in Group & Unit Census:  11    Topic: Communication and critical thinking    Goal of Group:Patient will identify benefits of using a variety of communication techniques      Comments:     Patient did not participate in Psych-Ed/Relapse Prevention group, despite staff encouragement and explanation of benefits. Patient remain seclusive to self. Q15 minute safety checks maintained for patient safety and will continue to encourage patient to attend unit programming.            Signature:  Jono Mohr, [address]

## 2022-12-27 NOTE — BH NOTE
Writer spoke to oldest daughter, Antionette Corona for collateral information. Daughter states mom drug of choice is Klonopin and Percocet however believes that if mom did take Xanax it could have been her boyfriend's Xanax. She states hereditary history of bipolar schizophrenia in her family, maternal grandmother has bipolar schizophrenia. Daughter states mom has always struggled with mental illness however no prior treatment until admitted at [facility] at [facility] on first week of November 2022 until December 16, 2022. Patient's daughter states that she was admitted at [facility] for mental illness issues and Percocet abuse. She also states mom to have had narcotic Anonymous classes prior to inpatient admission at [facility]. Daughter voices desire to have patient transferred at [facility] in [facility] however is aware that mom is not stable for discharge. Daughter was informed by writer attending will be informed about daughter wanted patient to be transferred to [facility] today in the meantime we will work on providing safe environment and stability of symptoms. Provider educated daughter, visitation and belonging policy per daughter's request.  No other questions at this time from daughter. Patient is currently with attending and unable to talk to daughter, nursing staff will call daughter at provided phone number when patient available.

## 2022-12-27 NOTE — PROGRESS NOTES
RT ASSESSMENT TREATMENT GOALS    []Pt Goal:  Pt will identify 1-2 positive coping skills by time of discharge. []Pt Goal:  Pt will identify 1-2 positive aspects of self by time of discharge. []Pt Goal:  Pt will remain on task/topic for 15-30 minutes during group by time of discharge. []Pt Goal:  Pt will identify 1-2 aspects of relapse prevention plan by time of discharge. [x]Pt Goal:  Pt will join in conversation with peers 1-2 times per group by time of discharge. []Pt Goal:  Pt will identify 1-2 new leisure interests by time of discharge. [x]Pt Goal: Pt will maintain behavorial control until the time of discharge.

## 2022-12-27 NOTE — H&P
Department of Psychiatry  Attending Physician Psychiatric Assessment     Reason for Admission to Psychiatric Unit:  Threat to self requiring 24 hour professional observation  A mental disorder causing major disability in social, interpersonal, occupational, and/or educational functioning that is leading to dangerous or life-threatening functioning, and that can only be addressed in an acute inpatient setting   Concerns about patient's safety in the community    CHIEF COMPLAINT: Suicidal ideation with overdose on 10 Xanax    History obtained from: Patient, electronic medical record          HISTORY OF PRESENT ILLNESS:    Dilshad Moran is a 46 y.o. female who has a past medical history of sickle cell. Patient presented to the [facility] ED via emergency admission due to overdose on xanax. Per emergency admission, \"patient admits to intentional Xanax overdose in a suicide attempt. Patient at very high risk for repeat attempt if patient goes home. Medically cleared. \"    Per EMR documentation, \"Patient arrives to the ER with the complaint of a Xanax overdose in order to \"go to sleep\". Patient states she was at an inpatient psychiatric unit at [facility] for almost 3 months. The EAP for patient's employer, [employer], was able to get admission there for patient through her [employer] coverage. Suad Truong states she has a follow-up psychiatric appointment tomorrow but the paperwork is in her car so she cannot say with whom or where. Patient says that she \"bought the pills from someone today\" and kept taking more in order to sleep and not have to think about anything. Patient says that she has still not gotten over the death of her dad 10 years ago and has been struggling with depression since. She had made a comment to staff that she \"wanted to be with her dad\" but denies SI/HI to SW. Patient tells SW that her boyfriend was to come see her last night and never came.   When patient went to find him he was with another woman and patient states they \"had words\". Patient states this is what caused her to want to take the pills today. Patient denies a history of suicide attempts or current drug/alcohol use/abuse. Patient states she is prescribed Risperdal, Wellbutrin, Doxepin, and Atarax which she takes as prescribed. SW discussed options with patient and she is agreeable to an inpatient admission but states she \"will not go to [facility] or [facility]. 's inpatient units\". Patient says she would like to go to either [facility] or [address] [place] [address]. SW explained that [facility] only admits from their ER. Patient should be medically clear between 9:30-11:30pm per Poison Control. Await lab results and med clearance.  . FLOYD Edwards\"     Patient is agreeable to intake assessment to sensory room. On assessment patient is guarded, suspicious, withdrawn, preoccupied and minimizes symptoms. She provides poor insight to own mental illness and behaviors that resulted to hospitalization. Patient states \"I was on the phone with a friend of mine who lost his mom 1 year ago and I lost my dad 8 years ago and friend called the  because I said I should take 10 Xanax\". Patient denies taking Xanax with intent to harm self and denies ever having any suicidal ideations. Also patient states \"I do not even have any Xanax\" but does clarify being prescribed Xanax in the past.  When asked to clarify in regards to statement wanting to be with that she stated \"I just want to talked with my dad because I was not there when he \". Patient has guarded, somewhat agitated and is focused on being discharged and states not wanting to be here because she has no issues. Patient provides poor insight to prior inpatient hospitalization and states being hospitalized at [facility] from 2022 to 2022 for counseling which she reports receiving \"EMDR\".   She denies any prior suicide attempts or any other prior inpatient hospitalizations. Patient reports being prescribed Risperdal, Wellbutrin, doxepin and Atarax at [facility] however states Dr. Shoaib Badillo from [facility] stopped medication on December 16, 2022 due to patient experiencing weight loss from meds. Patient reports being linked with [facility] however she states missing her first follow-up appointment on December 26, 2022. When asked reason for hospitalization at [facility] she states due to however stress, grieving issues with that passing however currently minimizes. She states struggling with low mood and stress during the holidays however currently denies because \"the holidays are over\". Denies majority of depressive symptoms. She does states sleeping 3 to 4 hours at night which she states is an issue. Patient reports working at [employer] 3 AM to 3:30 PM shift, 6 to 7 days a week for the past 20 years. She reports no change issues and states weight fluctuating. Nursing staff reports the patient is somewhat agitated, focused on discharge and refusing to meals and fluids. Patient is helpless, hopeless however, denies low mood, lack of energy, concentration, psychomotor slowing or suicidal thoughts. Patient denies any homicidal ideation. Patient denies any auditory visual hallucinations but does state having dreams about that for the past 2 to 3 years which she states having once every 3 to 4 months. Patient denies any fear of people watching her, talking about her or receiving any messages from media. She is thought blocking however no delusional statements or disorganized speech is noted during assessment. Patient denies any issues with anxiety or panic attacks. She denies any issues phobias, borderline personality disorder or body dysmorphic/eating disorders.   Patient reports prior issues with excessive cleansing in the past when \"daughter was diagnosed with chronic renal failure\" due to worried about cleanliness because of daughter's illness however currently denies any obsessive-compulsive tendencies. Patient states being raised by both parents in a stable household and grandparents help raise them. She reports having 1 sister and states not being close with any family due to them not living nearby. Patient reports having 4 adult children and 7 grandchildren live in the [facility]. Also she voices currently legally  but lives alone at private residence with poor family support. Denies any physical or sexual trauma as a child or adult however, she does report finding dad's passing as a traumatic event. She states that past 10 years ago and has left a level question answers for her due to not being there when he passed. Patient voices having dreams as a result to dad's loss which she states have been having for the past 2 to 3 years. Patient denies history of violence or aggression. She reports being arrested in the 90s for not being agitated, denies any recent legal issues. Patient denies issues with alcohol, nicotine, and reported drug use. She endorses utilizing \"THC cream bought from dispensary for chronic leg pain to both legs\". When informed of positive urine drug screen for benzodiazepine and oxycodone patient continues to deny drug use. She states \"I do not do any drugs for Tylenol\" but does clarify that she was positive for the same thing at [facility]. When asked why prescribed naltrexone patient states \"[facility] Dr. Primitivo Oneilka me to help me prevents from taking Percocets\". Blood alcohol level negative, urine drug screen positive for benzo diazepam and oxycodone on admission. PDMP reviewed: Naltrexone 50 mg 7 quantity, 7 days supply filled on 12/9/2022. Oxycodone-acetaminophen 5/325 milligrams 20 quantity, 5 days supply filled on 7/6/2022. Patient warrants further inpatient hospitalization due to instability symptoms and safety concern.        History of head trauma: [] Yes [x] No    History of seizures: [] Yes [x] No    History of violence or aggression: [] Yes [x] No         PSYCHIATRIC HISTORY:  [x] Yes [] No    Patient was linked with [place] however patient missed first follow-up appointment on 2022. Denies lifetime suicide attempts  1 prior psychiatric hospital admissions, inpatient [facility] clinic from October of fourth 2022 to 2022    Home Medication Compliance: [] Yes [x] No    Past psychiatric medications includes: Risperdal, Wellbutrin, Doxepin, and Atarax     Adverse reactions from psychotropic medications: [] Yes [x] No  Patient reports Risperdal, Wellbutrin and doxepin caused hair loss         Lifetime Psychiatric Review of Systems         Depression: Denies     Anxiety: Denies     Panic Attacks: Denies     Jeri or Hypomania: Denies     Phobias: Denies     Obsessions and Compulsions: Denies     Body or Vocal Tics: Denies, none evident     Visual Hallucinations: Denies     Auditory Hallucinations: Denies     Delusions/Paranoia: Denies     PTSD: Endorses    Past Medical History:        Diagnosis Date    H/O sickle cell trait     History of blood transfusion     Hypertension        Past Surgical History:        Procedure Laterality Date     SECTION  8798,1808    X2    COLONOSCOPY      DILATION AND CURETTAGE OF UTERUS      ECTOPIC PREGNANCY SURGERY  1998    KNEE ARTHROSCOPY Left 2010    KNEE ARTHROSCOPY Left 07/01/15    UPPER GASTROINTESTINAL ENDOSCOPY         Allergies:  Coconut fragrance and Pcn [penicillins]         Social History:     Born in: [facility]  Family: Patient states being raised by both parents in a stable household and grandparents help raise them. She reports having 1 sister and states not being close with any family due to them not living nearby. Patient reports having 4 adult children and 7 grandchildren live in the [facility] area.   Highest Level of Education: High school  Occupation: [employer] for 26 years working 3 AM to 3:30 PM  Marital Status: Legally  however   Children: 4 adult children and 7 grandchildren  Residence: Lives alone in private residence  Stressors: Financial stressors, own mental illness  Patient Assets/Supportive Factors: Willing to seek help         DRUG USE HISTORY  Social History     Tobacco Use   Smoking Status Never   Smokeless Tobacco Never     Social History     Substance and Sexual Activity   Alcohol Use Yes    Comment: SOCIAL     Social History     Substance and Sexual Activity   Drug Use No     Patient denies issues with alcohol, nicotine, and reported drug use. She endorses utilizing \"THC cream bought from dispensary for chronic leg pain to both legs\". When informed of positive urine drug screen for benzodiazepine and oxycodone patient continues to deny drug use. She states \"I do not do any drugs for Tylenol\" but does clarify that she was positive for the same thing at [facility]. When asked why prescribed naltrexone patient states \"[facility] Dr. Jose D Floresrey me to help me prevents from taking Percocets\". Blood alcohol level negative, urine drug screen positive for benzo diazepam and oxycodone on admission. PDMP reviewed: Naltrexone 50 mg 7 quantity, 7 days supply filled on 12/9/2022. Oxycodone-acetaminophen 5/325 milligrams 20 quantity, 5 days supply filled on 7/6/2022. LEGAL HISTORY:   HISTORY OF INCARCERATION: [x] Yes [] No  She reports being arrested in the 90s for not being agitated, denies any recent legal issues. Family History:       Problem Relation Age of Onset    Diabetes Father        Psychiatric Family History  Patient denies psychiatric family history. Per daughter Chanda Bhardwaj maternal grandmother bipolar/schizophrenia and long hereditary history of mental illness.      Suicides in family: [] Yes [x] No    Substance use in family: [x] Yes [] No  Mom doing crack, dad doing unknown drugs         PHYSICAL EXAM:  Vitals:  /75   Pulse 92   Temp 98.2 °F (36.8 °C) (Oral)   Resp 14   Wt 170 lb (77.1 kg)   BMI 31.09 kg/m²   Pain Level: Denies pain or discomfort    LABS:  Labs reviewed: [x] Yes  Metabolic Screening: No recent to be reviewed in EMR, ordered  HgBA1C/Lipid panel to be drawn tomorrow morning. Last EKG in EMR reviewed: [x] Yes  6/17/2015 QTc 438. Review of Systems   Constitutional: Negative for chills and weight loss. HENT: Negative for ear pain and nosebleeds. Eyes: Negative for blurred vision and photophobia. Respiratory: Negative for cough, shortness of breath and wheezing. Cardiovascular: Negative for chest pain and palpitations. Gastrointestinal: Negative for abdominal pain, diarrhea and vomiting. Genitourinary: Negative for dysuria and urgency. Musculoskeletal: Negative for falls and joint pain. Skin: Negative for itching and rash. Neurological: Negative for tremors, seizures and weakness. Endo/Heme/Allergies: Does not bruise/bleed easily. Physical Exam:   Constitutional:  Appears well-developed and well-nourished, no acute distress. HENT:   Head: Normocephalic and atraumatic. Eyes: Conjunctivae are normal. Right eye exhibits no discharge. Left eye exhibits no discharge. No scleral icterus. Neck: Normal range of motion. Neck supple. Pulmonary/Chest:  No respiratory distress or accessory muscle use, no wheezing. Cardiac: Regular rate and rhythm. Abdominal: Soft. Non-tender. Exhibits no distension. Musculoskeletal: Normal range of motion. Exhibits no edema. Neurological: cranial nerves II-XII grossly in tact, normal gait and station. Skin: Skin is warm and dry. Patient is not diaphoretic. No erythema.       Mental Status Examination:    Level of consciousness: Awake and alert  Appearance:  Appropriate attire, seated in chair, fair grooming   Behavior/Motor: Approachable, guarded, suspicious, minimal engagement  Attitude toward examiner:  Cooperative, distracted, avoids gaze speech: Normal rate, volume, and tone. Mood: Anxious, suspicious  Affect: Blunted  Thought processes: Circumstantial, paranoia, preoccupied  Thought content: Presented with suicidal ideations, suicide attempt by overdose on 10 xanax, unable to contract for safety off the unit. Denies homicidal ideations               Denies hallucinations              Denies delusions              Denies paranoia  Cognition:  Oriented to self, location, time, disorganized to situation  Concentration: Distracted  Memory: Poor  Insight &Judgment: Poor         DSM-5 Diagnosis    Principal Problem: Major depressive disorder, single episode, severe without psychosis ([facility] [address]    PTSD  Benzodiazepine use disorder  Opioid use disorder    Psychosocial and Contextual factors:  Financial X  Occupational   Relationship X  Legal   Living situation   Educational     Past Medical History:   Diagnosis Date    H/O sickle cell trait     History of blood transfusion     Hypertension         TREATMENT CONSIDERATIONS    Continue inpatient psychiatric treatment. Home medications reviewed. Medications as discussed with attending:  Monitor need and frequency of PRN medications. Attempt to develop insight. Follow-up daily while inpatient. Reviewed risks and benefits as well as potential side effects with patient. Lipid pannel and Hgb A1C ordered to be drawn tomorrow    CONSULT:  [x] Yes [] No  Internal medicine for medical management/medical H&P    Risk Management: close watch per standard protocol      Psychotherapy: participation in milieu and group and individual sessions with Attending Physician,  and Physician Assistant/CNP      Estimated length of stay:  2-14 days      GENERAL PATIENT/FAMILY EDUCATION  Patient will understand basic signs and symptoms, patient will understand benefits/risks and potential side effects from proposed medications, and patient will understand their role in recovery. Family is not active in patient's care. Patient assets that may be helpful during treatment include: Intent to participate and engage in treatment, sufficient fund of knowledge and intellect to understand and utilize treatments. Goals:    1) Remission of suicidal ideation. 2) Stabilization of symptoms prior to discharge. 3) Establish efficacy and tolerability of medications. Behavioral Services  Medicare Certification     Admission Day 1  I certify that this patient's inpatient psychiatric hospital admission is medically necessary for:    x (1) treatment which could reasonably be expected to improve this patient's condition, or    x (2) diagnostic study or its equivalent. Time Spent: 60 minutes    Jay Jay Pike is a 46 y.o. female being evaluated face to face    --CHRISTOPHER Gibson CNP on 12/27/2022 at 11:04 AM    An electronic signature was used to authenticate this note. I independently saw and evaluated the patient. I reviewed the nurse practitioners documentation above. Any additional comments or changes to the nurse practitioners documentation are stated below otherwise agree with assessment. Plan will be as follows:  Patient minimizes events leading up to her hospitalization. She states that she was overwhelmed when she found out about infidelity in her relationship. At present time she is declining any medication. We did discuss a release of information to speak with her daughter and help coordinate care. She is also minimizing of her relapse, discussed that she was positive for both Percocet and benzodiazepine. Asked if she wanted assistance with getting back into programming for substance use and she denies and states that she never took any Percocets. She is very guarded.   We will respect boundary for now we will try to further therapeutic alliance and will revisit medication discussion tomorrow  Electronically signed by Muriel Domínguez MD on 12/27/2022 at 1:47 PM

## 2022-12-27 NOTE — GROUP NOTE
Psych-Ed/Relapse Prevention Group Note        Date: December 27, 2022 Start Time: 11am  End Time: 11:45am      Number of Participants in Group & Unit Census:  7    Topic: Creative expression and stress management    Goal of Group:Patient will identify benefits of creative expression for relaxation and stress management      Comments:     Patient did not participate in Psych-Ed/Relapse Prevention group, despite staff encouragement and explanation of benefits. Patient remain seclusive to self. Q15 minute safety checks maintained for patient safety and will continue to encourage patient to attend unit programming.          Signature:  Dinh Sidhu, [address]

## 2022-12-28 PROCEDURE — 6370000000 HC RX 637 (ALT 250 FOR IP): Performed by: PSYCHIATRY & NEUROLOGY

## 2022-12-28 PROCEDURE — 6370000000 HC RX 637 (ALT 250 FOR IP)

## 2022-12-28 PROCEDURE — APPSS30 APP SPLIT SHARED TIME 16-30 MINUTES

## 2022-12-28 PROCEDURE — 1240000000 HC EMOTIONAL WELLNESS R&B

## 2022-12-28 RX ORDER — DULOXETIN HYDROCHLORIDE 20 MG/1
20 CAPSULE, DELAYED RELEASE ORAL DAILY
Status: DISCONTINUED | OUTPATIENT
Start: 2022-12-28 | End: 2022-12-29

## 2022-12-28 RX ORDER — CYCLOBENZAPRINE HCL 10 MG
10 TABLET ORAL ONCE
Status: COMPLETED | OUTPATIENT
Start: 2022-12-28 | End: 2022-12-28

## 2022-12-28 RX ADMIN — TRAZODONE HYDROCHLORIDE 50 MG: 50 TABLET ORAL at 22:11

## 2022-12-28 RX ADMIN — HYDROXYZINE HYDROCHLORIDE 50 MG: 50 TABLET ORAL at 22:11

## 2022-12-28 RX ADMIN — CYCLOBENZAPRINE 10 MG: 10 TABLET, FILM COATED ORAL at 12:25

## 2022-12-28 RX ADMIN — LORAZEPAM 2 MG: 1 TABLET ORAL at 17:57

## 2022-12-28 RX ADMIN — DULOXETINE HYDROCHLORIDE 20 MG: 20 CAPSULE, DELAYED RELEASE ORAL at 12:25

## 2022-12-28 RX ADMIN — ACETAMINOPHEN 650 MG: 325 TABLET ORAL at 16:12

## 2022-12-28 ASSESSMENT — PAIN DESCRIPTION - ORIENTATION
ORIENTATION: RIGHT;LEFT
ORIENTATION: RIGHT;LEFT

## 2022-12-28 ASSESSMENT — PAIN DESCRIPTION - LOCATION
LOCATION: BACK;LEG
LOCATION: BACK

## 2022-12-28 ASSESSMENT — PAIN DESCRIPTION - DESCRIPTORS
DESCRIPTORS: ACHING;DISCOMFORT
DESCRIPTORS: ACHING;DISCOMFORT

## 2022-12-28 ASSESSMENT — PAIN SCALES - WONG BAKER: WONGBAKER_NUMERICALRESPONSE: 0

## 2022-12-28 ASSESSMENT — PAIN SCALES - GENERAL
PAINLEVEL_OUTOF10: 7
PAINLEVEL_OUTOF10: 8

## 2022-12-28 NOTE — PROGRESS NOTES
Daily Progress Note  12/28/2022    Patient Name: [facility]    CHIEF COMPLAINT: Suicidal ideation with overdose on Xanax         SUBJECTIVE:      Patient is seen today for a follow up assessment. Los Christensen is compliant with scheduled Cymbalta. She remains behaviorally in control and has not required emergency medications in the past 24 hours. She does appear to be utilizing Atarax and trazodone at night for sleep. This writer sat down with Los Christensen in unit milieu today. She is very irritable and when asked about events leading up to hospitalization she is not forthcoming but states, \"Everyone knows why I am here, you can read about it in my chart. \"  She is hostile and appears to lack significant insight into her mental illness as well as the significance of her suicide attempt by overdosing on Xanax. She denies any symptoms of depression or anxiety. She denies suicidal or homicidal ideation. She denies auditory or visual hallucinations. She does appear to be very minimizing of her symptoms and not forthcoming with information. She seems to lack significant insight into her mental illness and therefore would be unsafe out of the hospital at this time. She denies any side effects to Cymbalta. Appetite:  [x] Normal/Adequate/Unchanged  [] Increased  [] Decreased      Sleep:       [x] Normal/Adequate/Unchanged  [] Fair  [] Poor      Group Attendance on Unit:   [] Yes  [] Selectively    [x] No    Medication Side Effects:  Patient denies any medication side effects at the time of assessment. Mental Status Exam  Level of consciousness: Alert and awake. Appearance: Appropriate attire for setting, seated in chair, with fair  grooming and hygiene. Behavior/Motor: Irritable, evasive, minimizing of symptoms  Attitude toward examiner: Semicooperative, attentive, fair eye contact. Speech: Normal rate, normal volume, irritable tone. Mood:  Patient reports \"fine\".    Affect: Irritable  Thought processes: Linear and coherent. Thought content: Denies homicidal ideation. Suicidal Ideation: Denies suicidal ideations  Delusions: No evidence of delusions. Denies paranoia. Perceptual Disturbance: Patient does not appear to be responding to internal stimuli. Denies auditory hallucinations. Denies visual hallucinations. Cognition: Oriented to self, location, time, and situation. Memory: Intact. Insight & Judgement: Poor. Data   weight is 170 lb (77.1 kg). Her oral temperature is 98 °F (36.7 °C). Her blood pressure is 132/72 and her pulse is 78. Her respiration is 14. Labs:   No visits with results within 2 Day(s) from this visit.    Latest known visit with results is:   Admission on 12/25/2022, Discharged on 12/26/2022   Component Date Value Ref Range Status    WBC 12/25/2022 5.4  3.5 - 11.3 k/uL Final    RBC 12/25/2022 4.88  3.95 - 5.11 m/uL Final    Hemoglobin 12/25/2022 9.1 (A)  11.9 - 15.1 g/dL Final    Hematocrit 12/25/2022 31.7 (A)  36.3 - 47.1 % Final    MCV 12/25/2022 65.0 (A)  82.6 - 102.9 fL Final    MCH 12/25/2022 18.6 (A)  25.2 - 33.5 pg Final    MCHC 12/25/2022 28.7  28.4 - 34.8 g/dL Final    RDW 12/25/2022 17.2 (A)  11.8 - 14.4 % Final    Platelets 98/49/7211 See Reflexed IPF Result  138 - 453 k/uL Final    NRBC Automated 12/25/2022 0.0  0.0 per 100 WBC Final    Immature Granulocytes 12/25/2022 1 (A)  0 % Final    Seg Neutrophils 12/25/2022 60  36 - 66 % Final    Lymphocytes 12/25/2022 29  24 - 44 % Final    Monocytes 12/25/2022 6  1 - 7 % Final    Eosinophils % 12/25/2022 4  1 - 4 % Final    Basophils 12/25/2022 0  0 - 2 % Final    Absolute Immature Granulocyte 12/25/2022 0.05  0.00 - 0.30 k/uL Final    Segs Absolute 12/25/2022 3.24  1.8 - 7.7 k/uL Final    Absolute Lymph # 12/25/2022 1.57  1.0 - 4.8 k/uL Final    Absolute Mono # 12/25/2022 0.32  0.1 - 0.8 k/uL Final    Absolute Eos # 12/25/2022 0.22  0.0 - 0.4 k/uL Final    Basophils Absolute 12/25/2022 0.00  0.0 - 0.2 k/uL Final Morphology 12/25/2022 ANISOCYTOSIS PRESENT   Final    Morphology 12/25/2022 MICROCYTOSIS PRESENT   Final    Morphology 12/25/2022 1+ TEARDROPS   Final    Morphology 12/25/2022 1+ ELLIPTOCYTES   Final    Acetaminophen Level 12/25/2022 <5 (A)  10 - 30 ug/mL Final    Ethanol 12/25/2022 <10  <10 mg/dL Final    Ethanol percent 12/25/2022 <0.010  <5.314 % Final    Salicylate Lvl 95/93/9243 <1 (A)  3 - 10 mg/dL Final    Toxic Tricyclic Sc,Blood 27/52/1188 NEGATIVE  NEGATIVE Final    Glucose 12/25/2022 146 (A)  70 - 99 mg/dL Final    BUN 12/25/2022 9  6 - 20 mg/dL Final    Creatinine 12/25/2022 0.69  0.50 - 0.90 mg/dL Final    Est, Glom Filt Rate 12/25/2022 >60  >60 mL/min/1.73m2 Final    Comment:       Effective Oct 3, 2022        These results are not intended for use in patients <25years of age. eGFR results are calculated without a race factor using the 2021 CKD-EPI equation. Careful clinical correlation is recommended, particularly when comparing to results   calculated using previous equations. The CKD-EPI equation is less accurate in patients with extremes of muscle mass, extra-renal   metabolism of creatine, excessive creatine ingestion, or following therapy that affects   renal tubular secretion.       Calcium 12/25/2022 8.5 (A)  8.6 - 10.4 mg/dL Final    Sodium 12/25/2022 137  135 - 144 mmol/L Final    Potassium 12/25/2022 3.7  3.7 - 5.3 mmol/L Final    Chloride 12/25/2022 104  98 - 107 mmol/L Final    CO2 12/25/2022 23  20 - 31 mmol/L Final    Anion Gap 12/25/2022 10  9 - 17 mmol/L Final    Alkaline Phosphatase 12/25/2022 96  35 - 104 U/L Final    ALT 12/25/2022 41 (A)  5 - 33 U/L Final    AST 12/25/2022 29  <32 U/L Final    Total Bilirubin 12/25/2022 0.4  0.3 - 1.2 mg/dL Final    Total Protein 12/25/2022 6.1 (A)  6.4 - 8.3 g/dL Final    Albumin 12/25/2022 3.6  3.5 - 5.2 g/dL Final    Albumin/Globulin Ratio 12/25/2022 1.4  1.0 - 2.5 Final    hCG Qual 12/25/2022 NEGATIVE  NEGATIVE Final    Comment: Specimens with hCG levels near the threshold of the test (25 mIU/mL) may give a negative or   indeterminate result. In such cases, another test should be performed with a new specimen   in 48-72 hours. If early pregnancy is suspected clinically in this setting, correlation   with quantitative serum b-hCG level is suggested. [employer] has confirmed the use of plasma for this test. This has not been cleared   or approved by the U.S. Food and Drug Administration. The FDA has determined that such   clearance is not necessary. Amphetamine Screen, Ur 12/25/2022 NEGATIVE  NEGATIVE Final    Comment:       (Positive cutoff 1000 ng/mL)                  Barbiturate Screen, Ur 12/25/2022 NEGATIVE  NEGATIVE Final    Comment:       (Positive cutoff 200 ng/mL)                  Benzodiazepine Screen, Urine 12/25/2022 POSITIVE (A)  NEGATIVE Final    Comment:       (Positive cutoff 200 ng/mL)                  Cocaine Metabolite, Urine 12/25/2022 NEGATIVE  NEGATIVE Final    Comment:       (Positive cutoff 300 ng/mL)                  Methadone Screen, Urine 12/25/2022 NEGATIVE  NEGATIVE Final    Comment:       (Positive cutoff 300 ng/mL)                  Opiates, Urine 12/25/2022 NEGATIVE  NEGATIVE Final    Comment:       (Positive cutoff 300 ng/mL)                  Phencyclidine, Urine 12/25/2022 NEGATIVE  NEGATIVE Final    Comment:       (Positive cutoff 25 ng/mL)                  Cannabinoid Scrn, Ur 12/25/2022 NEGATIVE  NEGATIVE Final    Comment:       (Positive cutoff 50 ng/mL)                  Oxycodone Screen, Ur 12/25/2022 POSITIVE (A)  NEGATIVE Final    Comment:       (Positive cutoff 100 ng/mL)                  Fentanyl, Ur 12/25/2022 NEGATIVE  NEGATIVE Final    Comment:       (Positive cutoff  5 ng/ml)            Test Information 12/25/2022 Assay provides medical screening only.   The absence of expected drug(s) and/or metabolite(s) may indicate diluted or adulterated urine, limitations of testing or timing of collection. Final    Comment: Testing for legal purposes should be confirmed by another method. To request confirmation   of test result, please call the lab within 7 days of sample submission. Platelet, Immature Fraction 12/25/2022 4.4  1.1 - 10.3 % Final    ORDERED BY LAB    Platelet, Fluorescence 12/25/2022 162  138 - 453 k/uL Final    ORDERED BY LAB    Specimen Description 12/25/2022 . NASOPHARYNGEAL SWAB   Final    SARS-CoV-2, Rapid 12/25/2022 Not Detected  Not Detected Final    Comment:       Rapid NAAT:  The specimen is NEGATIVE for SARS-CoV-2, the novel coronavirus associated with   COVID-19. The ID NOW COVID-19 assay is designed to detect the virus that causes COVID-19 in patients   with signs and symptoms of infection who are suspected of COVID-19. An individual without symptoms of COVID-19 and who is not shedding SARS-CoV-2 virus would   expect to have a negative (not detected) result in this assay. Negative results should be treated as presumptive and, if inconsistent with clinical signs   and symptoms or necessary for patient management,  should be tested with an alternative molecular assay. Negative results do not preclude   SARS-CoV-2 infection and   should not be used as the sole basis for patient management decisions. Fact sheet for Healthcare Providers: http://www.sushila-mei.corinne/  Fact sheet for Patients: http://www.sushila-mei.corinne/        Methodology: Isothermal Nucleic Acid Amplification           Reviewed patient's current plan of care and vital signs with nursing staff.     Labs reviewed: [x] Yes  Last EKG in EMR reviewed: [x] Yes  QTc: 438    Medications  Current Facility-Administered Medications: DULoxetine (CYMBALTA) extended release capsule 20 mg, 20 mg, Oral, Daily  hydrOXYzine HCl (ATARAX) tablet 50 mg, 50 mg, Oral, TID PRN  acetaminophen (TYLENOL) tablet 650 mg, 650 mg, Oral, Q4H PRN  ibuprofen (ADVIL;MOTRIN) tablet 400 mg, 400 mg, Oral, Q6H PRN  aluminum & magnesium hydroxide-simethicone (MAALOX) 200-200-20 MG/5ML suspension 30 mL, 30 mL, Oral, Q6H PRN  polyethylene glycol (GLYCOLAX) packet 17 g, 17 g, Oral, Daily PRN  traZODone (DESYREL) tablet 50 mg, 50 mg, Oral, Nightly PRN  haloperidol (HALDOL) tablet 5 mg, 5 mg, Oral, Q6H PRN **AND** LORazepam (ATIVAN) tablet 2 mg, 2 mg, Oral, Q6H PRN  haloperidol lactate (HALDOL) injection 5 mg, 5 mg, IntraMUSCular, Q6H PRN **AND** LORazepam (ATIVAN) injection 2 mg, 2 mg, IntraMUSCular, Q6H PRN **AND** diphenhydrAMINE (BENADRYL) injection 50 mg, 50 mg, IntraMUSCular, Q6H PRN    ASSESSMENT  Major depressive disorder, single episode, severe without psychosis ([facility] [address]         HANDOFF  Patient symptoms are: Remains Unstable. Medications as determined by attending physician  Monitor need and frequency of PRN medications. Encourage participation in groups and milieu. Probable discharge is to be determined by MD.     Electronically signed by CHRISTOPHER Ruelas CNP on 12/28/2022 at 2:01 PM    **This report has been created using voice recognition software. It may contain minor errors which are inherent in voice recognition technology. **  I independently saw and evaluated the patient. I reviewed the nurse practitioners documentation above. Any additional comments or changes to the nurse practitioners documentation are stated below otherwise agree with assessment. Plan will be as follows:  Spent 30 minutes with the patient, of that approximately 20 minutes was spent in supportive psychotherapy. Patient denying side effects to medication. Reporting improvement in mood. Denying suicidal or homicidal ideation intent or plan. Denying psychotic symptoms. Forward-looking and constructive.   Discussed if continued stability or improvement through tomorrow would consider discharge and patient is in agreement    PLAN  Patient s symptoms   are improving  Continue with current medication for now  Attempt to develop insight  Psycho-education conducted. Supportive Therapy conducted.   Probable discharge is tomorrow  Follow-up daily while on inpatient unit

## 2022-12-28 NOTE — GROUP NOTE
"HISTORY OF PRESENT ILLNESS:    Amor Collins is a 41 y.o. female, , Patient's last menstrual period was 2017.,  presents for a routine exam and has no complaints. PAP DUE .  MAMMO HAS BEEN SCHEDULED TO FOLLOW THIS VISIT LATER THIS AM.  C/O INCREASED KEEN AND INCREASED HAIR GROWTH - WILL CHECK ANDROGENS 5-6 WEEKS OFF OCP ON , THEN RESTART OCP, WITHDRAW SEASONALLY.  MENSES NOW MONTHLY, LAST 4 DAYS, AMOUNT OF FLOW IRREGULAR.    LAST VISIT :  MAMMO SCHEDULED AND PAP DUE, SUBMITTED.  TOOK 5YO TO [facility] THIS YEAR.  DISCUSSED CONTINUED OCP AND POSS LASER OR ELECTROLYSIS FOR HAIR  LAST VISIT :  PAP UP TO DATE AND NEEDS REFILL OCP. NO C/O. HAS OCCASIONAL VAGINAL DISCHARGE PRIOR TO MENSES OVER PAST FEW MONTHS - WEARS PANTY LINER DAILY AND COUNSELED  LAST VISIT 2014:  HAIR GROWTH SINCE HER DELIVERY. WAXES. TRIED LASER AND DIDN'T WORK. HX OF PCOS; HAS BEEN MAINTAINED ON OCP, NO LO OVRAL DUE TO POOR CYCLE CONTROL WITH JA[facility]  LABWORK; VANIQUA PREV NOT EFFECTIVE WHILE OFF OCP BUT WILL RE-RTY NOW THAT TAKING OCP. ASSUMING LABS NORMAL WILL F/U WITH DERM TO SEE IF THERE'S IMPROVEMENT  DENIES CHANGES IN VOICE, ENLARGING CLITORIS. HAS SOME MORE HAIR GROWTH ON SUPERIOR THIGHS  LAST VISIT , PP VISIT:  Amor Collins is a 36 y.o. female  presents for a postpartum visit. She is status post NSVD6 weeks ago. Her hospitalization was not complicated. She is not breastfeeding. She desires oral contraceptives (estrogen/progesterone) for contraception. She admits to postpartum depression. NEEDS OCP FOR "HORMONES OUT OF WHACK" AND HAIR ON FACE    SOME PP DEPRESSION - DENIES HI OR SI.  2012, BOTTLE FEEDING, 6'15"ROHEN    C/O SOME VULVAR IRRITATION - ADVISED STOP ALWAYS PADS      Past Medical History:   Diagnosis Date    Abnormal Pap smear     treated with repeat Pap    Hirsutism     History of migraine headaches     Insulin resistance     PCO (polycystic ovaries)  " Group Therapy Note    Date: 12/28/2022    Group Start Time: 1100  Group End Time: 3201  Group Topic: Psychoeducation    [facility]    Pedro Luis Salgado  Psych Ed Group Note        Date:  December 28, 2022    Start Time: 11am End Time: 11:40 am      Number of Participants in Group & Unit Census:  9/20    Topic: Values, self-awareness    Goal of Group: To increase awareness of values. To increase self-awareness. To increase interpersonal communication with peers. Comments:     Patient did not participate in [address] group, despite staff encouragement and explanation of benefits. Patient remain seclusive to self. Q15 minute safety checks maintained for patient safety and will continue to encourage patient to attend unit programming.        Signature:  Pedro Luis Salgado       History reviewed. No pertinent surgical history.    MEDICATIONS AND ALLERGIES:      Current Outpatient Prescriptions:     eflornithine 13.9 % cream, Apply topically 2 (two) times daily., Disp: 30 g, Rfl: 2    naproxen (NAPROSYN) 500 MG tablet, Take 1 tablet (500 mg total) by mouth 2 (two) times daily with meals., Disp: 60 tablet, Rfl: 2    norgestrel-ethinyl estradiol (LO/OVRAL) 0.3-30 mg-mcg per tablet, Take 1 tablet by mouth once daily., Disp: 84 tablet, Rfl: 4    Review of patient's allergies indicates:   Allergen Reactions    Metformin      Other reaction(s): Diarrhea  diarrhea       Family History   Problem Relation Age of Onset    Aneurysm Mother     Cancer Paternal Grandfather     Hyperlipidemia Father     No Known Problems Son     Breast cancer Neg Hx     Colon cancer Neg Hx     Ovarian cancer Neg Hx        Social History     Social History    Marital status:      Spouse name: N/A    Number of children: N/A    Years of education: N/A     Occupational History    Not on file.     Social History Main Topics    Smoking status: Never Smoker    Smokeless tobacco: Never Used    Alcohol use No    Drug use: No    Sexual activity: Yes     Partners: Male     Birth control/ protection: OCP     Other Topics Concern    Not on file     Social History Narrative    No narrative on file       COMPREHENSIVE GYN HISTORY:  PAP History: Denies abnormal Paps.  Infection History: Denies STDs. Denies PID.  Benign History: Denies uterine fibroids. Denies ovarian cysts. Denies endometriosis. Denies other conditions.  Cancer History: Denies cervical cancer. Denies uterine cancer or hyperplasia. Denies ovarian cancer. Denies vulvar cancer or pre-cancer. Denies vaginal cancer or pre-cancer. Denies breast cancer. Denies colon cancer.  Sexual Activity History: Reports currently being sexually active  Menstrual History: Monthly, mild-moderate.  Contraception:oral contraceptives  "(estrogen/progesterone)    ROS:  GENERAL: No weight changes. No swelling. No fatigue. No fever.  CARDIOVASCULAR: No chest pain. No shortness of breath. No leg cramps.   NEUROLOGICAL: No headaches. No vision changes.  BREASTS: No pain. No lumps. No discharge.  ABDOMEN: No pain. No nausea. No vomiting. No diarrhea. No constipation.  REPRODUCTIVE: No abnormal bleeding.   VULVA: No pain. No lesions. No itching.  VAGINA: No relaxation. No itching. No odor. No discharge. No lesions.  URINARY: No incontinence. No nocturia. No frequency. No dysuria.    /76   Ht 5' 5" (1.651 m)   Wt 78 kg (171 lb 15.3 oz)   LMP 11/06/2017   BMI 28.62 kg/m²     PE:  APPEARANCE: Well nourished, well developed, in no acute distress.  AFFECT: WNL, alert and oriented x 3.  SKIN: No acne or hirsutism.  NECK: Neck symmetric, without masses or thyromegaly.  NODES: No inguinal, cervical, axillary or femoral lymph node enlargement.  CHEST: Good respiratory effort.   ABDOMEN: Soft. No tenderness or masses. No hepatosplenomegaly. No hernias.  BREASTS: Symmetrical, no skin changes, visible lesions, palpable masses or nipple discharge bilaterally.  PELVIC: External female genitalia without lesions.  Female hair distribution. Adequate perineal body, Normal urethral meatus. Vagina moist and well rugated without lesions or discharge.  No significant cystocele or rectocele present. Cervix pink without lesions, discharge or tenderness. Uterus is normal size, regular, mobile and nontender. Adnexa without masses or tenderness.  EXTREMITIES: No edema    PROCEDURES:      DIAGNOSIS:  1. Visit for gynecologic examination     2. Female hirsutism  Testosterone, free    DHEA-sulfate    17-Hydroxyprogesterone   3. Encounter for surveillance of contraceptive pills         LABS AND TESTS ORDERED:    MEDICATIONS PRESCRIBED:    COUNSELING:   The patient was counseled today on ACS PAP guidelines, with recommendations for yearly pelvic exams unless their uterus, " cervix, and ovaries were removed for benign reasons; in that case, examinations every 3-5 years are recommended.  The patient was also counseled regarding monthly breast self-examination, routine STD screening for at-risk populations, prophylactic immunizations for transmitted infections such as  HPV, Pertussis, or Influenza as appropriate, and yearly mammograms when indicated by ACS guidelines.  She was advised to see her primary care physician for all other health maintenance.    FOLLOW-UP with me annually.

## 2022-12-28 NOTE — PLAN OF CARE
Problem: Risk for Elopement  Goal: Patient will not exit the unit/facility without proper excort  12/28/2022 0305 by Jigna Jonas RN  Outcome: Progressing  Flowsheets (Taken 12/28/2022 0224)  Nursing Interventions for Elopement Risk:   Make sure patient has all necessary personal care items   Escort with two staff members if patient must leave the unit   Reduce environmental triggers  12/27/2022 1430 by Kellee Lucio RN  Outcome: Progressing     Problem: ABCDS Injury Assessment  Goal: Absence of physical injury  12/28/2022 0305 by Jigna Jonas RN  Outcome: Progressing  Flowsheets (Taken 12/28/2022 0305)  Absence of Physical Injury: Implement safety measures based on patient assessment  12/27/2022 1430 by Kellee Lucio RN  Outcome: Progressing     Problem: Behavior  Goal: Pt/Family maintain appropriate behavior and adhere to behavioral management agreement, if implemented  Description: INTERVENTIONS:  1. Assess patient/family's coping skills and  non-compliant behavior (including use of illegal substances)  2. Notify security of behavior or suspected illegal substances which indicate the need for search of the family and/or belongings  3. Encourage verbalization of thoughts and concerns in a socially appropriate manner  4. Utilize positive, consistent limit setting strategies supporting safety of patient, staff and others  5. Encourage participation in the decision making process about the behavioral management agreement  6. If a visitor's behavior poses a threat to safety call refer to organization policy.   7. Initiate consult with , Psychosocial CNS, Spiritual Care as appropriate  12/28/2022 0305 by Jigna Jonas RN  Outcome: Progressing  Flowsheets  Taken 12/28/2022 0230  Patient/family maintains appropriate behavior and adheres to behavioral management agreement, if implemented: Encourage verbalization of thoughts and concerns in a socially appropriate manner  Taken 12/28/2022 0224  Patient/family maintains appropriate behavior and adheres to behavioral management agreement, if implemented: Encourage verbalization of thoughts and concerns in a socially appropriate manner  Note: Patient maintains behavioral control this shift. Patient cooperative and polite this shift. 12/27/2022 1430 by Jerrell Carter RN  Outcome: Progressing     Problem: Anxiety  Goal: Will report anxiety at manageable levels  Description: INTERVENTIONS:  1. Administer medication as ordered  2. Teach and rehearse alternative coping skills  3. Provide emotional support with 1:1 interaction with staff  12/28/2022 0305 by Kapil Hearn RN  Outcome: Progressing  Flowsheets (Taken 12/28/2022 0224)  Will report anxiety at manageable levels:   Administer medication as ordered   Teach and rehearse alternative coping skills   Provide emotional support with 1:1 interaction with staff  12/27/2022 1430 by Jerrell Carter RN  Outcome: Progressing  Flowsheets  Taken 12/27/2022 1112  Will report anxiety at manageable levels:   Administer medication as ordered   Teach and rehearse alternative coping skills   Provide emotional support with 1:1 interaction with staff  Taken 12/27/2022 1106  Will report anxiety at manageable levels:   Administer medication as ordered   Teach and rehearse alternative coping skills   Provide emotional support with 1:1 interaction with staff  Taken 12/27/2022 1105  Will report anxiety at manageable levels:   Administer medication as ordered   Teach and rehearse alternative coping skills   Provide emotional support with 1:1 interaction with staff     Problem: Involuntary Admit  Goal: Will cooperate with staff recommendations and doctor's orders and will demonstrate appropriate behavior  Description: INTERVENTIONS:  1. Treat underlying conditions and offer medication as ordered  2. Educate regarding involuntary admission procedures and rules  3.  Contain excessive/inappropriate behavior per unit and hospital policies  [id number] by Leroy Oneil RN  Outcome: Progressing  Flowsheets  Taken 12/28/2022 0230  Will cooperate with staff recommendations and doctor's orders and will demonstrate appropriate behavior: Treat underlying conditions and offer medication as ordered  Taken 12/28/2022 0224  Will cooperate with staff recommendations and doctor's orders and will demonstrate appropriate behavior:   Treat underlying conditions and offer medication as ordered   Educate regarding involuntary admission procedures and rules  12/27/2022 1430 by Sri Bowman RN  Outcome: Progressing     Problem: Pain  Goal: Verbalizes/displays adequate comfort level or baseline comfort level  12/28/2022 0305 by Leroy Oneil RN  Outcome: Progressing  Flowsheets (Taken 12/28/2022 0305)  Verbalizes/displays adequate comfort level or baseline comfort level:   Encourage patient to monitor pain and request assistance   Assess pain using appropriate pain scale   Administer analgesics based on type and severity of pain and evaluate response   Implement non-pharmacological measures as appropriate and evaluate response   Notify Licensed Independent Practitioner if interventions unsuccessful or patient reports new pain  Note: Patient continues to report back and leg pain this shift. One time dose of Flexeril ordered and administered.    12/27/2022 1430 by Sri Bowman RN  Outcome: Progressing

## 2022-12-28 NOTE — PLAN OF CARE
Problem: Risk for Elopement  Goal: Patient will not exit the unit/facility without proper excort  12/28/2022 1510 by Sly Sanchez RN  Outcome: Progressing  Patient reports feeling depressed. Spent 1:1 with pt x ten minutes. Pt encouraged to attend unit programming and interact with peers and staff. Pt also encouraged to tend to hygiene and ADLs. Pt encouraged to discuss feelings with staff and feedback and reassurance provided. Pt denies thoughts of self harm and is agreeable to seeking out should thoughts of self harm arise. Safe environment maintained. Patient denies having acute pain issues. Patient has available PRN medication as needed for relief of pain. Q 15 minute checks for safety maintained and at irregular intervals per unit policy. Will cont to monitor for safety and provides support and reassurance as needed. Problem: ABCDS Injury Assessment  Goal: Absence of physical injury  12/28/2022 1510 by Sly Sanchez RN  Outcome: Progressing  Patient reports feeling depressed. Spent 1:1 with pt x ten minutes. Pt encouraged to attend unit programming and interact with peers and staff. Pt also encouraged to tend to hygiene and ADLs. Pt encouraged to discuss feelings with staff and feedback and reassurance provided. Pt denies thoughts of self harm and is agreeable to seeking out should thoughts of self harm arise. Safe environment maintained. Patient denies having acute pain issues. Patient has available PRN medication as needed for relief of pain. Q 15 minute checks for safety maintained and at irregular intervals per unit policy. Will cont to monitor for safety and provides support and reassurance as needed. Problem: Behavior  Goal: Pt/Family maintain appropriate behavior and adhere to behavioral management agreement, if implemented  Description: INTERVENTIONS:  1. Assess patient/family's coping skills and  non-compliant behavior (including use of illegal substances)  2.  Notify security of behavior or suspected illegal substances which indicate the need for search of the family and/or belongings  3. Encourage verbalization of thoughts and concerns in a socially appropriate manner  4. Utilize positive, consistent limit setting strategies supporting safety of patient, staff and others  5. Encourage participation in the decision making process about the behavioral management agreement  6. If a visitor's behavior poses a threat to safety call refer to organization policy. 7. Initiate consult with , Psychosocial CNS, Spiritual Care as appropriate  12/28/2022 1510 by Eliseo Hughes RN  Outcome: Progressing    Patient reports feeling depressed. Spent 1:1 with pt x ten minutes. Pt encouraged to attend unit programming and interact with peers and staff. Pt also encouraged to tend to hygiene and ADLs. Pt encouraged to discuss feelings with staff and feedback and reassurance provided. Pt denies thoughts of self harm and is agreeable to seeking out should thoughts of self harm arise. Safe environment maintained. Patient denies having acute pain issues. Patient has available PRN medication as needed for relief of pain. Q 15 minute checks for safety maintained and at irregular intervals per unit policy. Will cont to monitor for safety and provides support and reassurance as needed. Problem: Anxiety  Goal: Will report anxiety at manageable levels  Description: INTERVENTIONS:  1. Administer medication as ordered  2. Teach and rehearse alternative coping skills  3. Provide emotional support with 1:1 interaction with staff  12/28/2022 1510 by Eliseo Hughes RN  Outcome: Progressing    Problem: Involuntary Admit  Goal: Will cooperate with staff recommendations and doctor's orders and will demonstrate appropriate behavior  Description: INTERVENTIONS:  1. Treat underlying conditions and offer medication as ordered  2. Educate regarding involuntary admission procedures and rules  3. Contain excessive/inappropriate behavior per unit and hospital policies  [id number] by Shira Almendarez RN  Outcome: Progressing  Patient reports feeling depressed. Spent 1:1 with pt x ten minutes. Pt encouraged to attend unit programming and interact with peers and staff. Pt also encouraged to tend to hygiene and ADLs. Pt encouraged to discuss feelings with staff and feedback and reassurance provided. Pt denies thoughts of self harm and is agreeable to seeking out should thoughts of self harm arise. Safe environment maintained. Patient denies having acute pain issues. Patient has available PRN medication as needed for relief of pain. Q 15 minute checks for safety maintained and at irregular intervals per unit policy. Will cont to monitor for safety and provides support and reassurance as needed. Problem: Pain  Goal: Verbalizes/displays adequate comfort level or baseline comfort level  12/28/2022 1510 by Shira Almendarez RN  Outcome: Progressing    Patient reports feeling depressed. Spent 1:1 with pt x ten minutes. Pt encouraged to attend unit programming and interact with peers and staff. Pt also encouraged to tend to hygiene and ADLs. Pt encouraged to discuss feelings with staff and feedback and reassurance provided. Pt denies thoughts of self harm and is agreeable to seeking out should thoughts of self harm arise. Safe environment maintained. Patient denies having acute pain issues. Patient has available PRN medication as needed for relief of pain. Q 15 minute checks for safety maintained and at irregular intervals per unit policy. Will cont to monitor for safety and provides support and reassurance as needed.

## 2022-12-28 NOTE — GROUP NOTE
Group Therapy Note    Date: 12/28/2022    Group Start Time: 1430  Group End Time: 1520  Group Topic: Cognitive Skills    ELMER MALIK    Anjelica Hankins, [address]    Cognitive Skills Group Note        Date: December 28, 2022 Start Time: 2:30pm  End Time:  3:20 pm      Number of Participants in Group & Unit Census:  4/14    Topic: Cognitive skills, leisure exploration    Goal of Group: To improve cognitive functioning. To increase decision making abilities, turn taking abilities and ability to concentrate. To increase interpersonal interactions with peers. Comments:     Patient did not participate in Cognitive Skills group, despite staff encouragement and explanation of benefits. Patient remain seclusive to self. Q15 minute safety checks maintained for patient safety and will continue to encourage patient to attend unit programming.         Signature:  Anjelica Hankins [address]

## 2022-12-28 NOTE — GROUP NOTE
Group Therapy Note    Date: 12/28/2022    Group Start Time: 0900  Group End Time: 0915  Group Topic: Community Meeting    [facility]    [facility] [address] Meeting Group Note        Date: 12/28/2022   Start Time: 9am  End Time: 7098      Number of Participants in Group & Unit Census: 6/22      Goal of Group: To discuss daily goals        Comments:     Patient did not participate in community meeting group, despite staff encouragement and explanation of benefits. Patient remain seclusive to self. Q15 minute safety checks maintained for patient safety and will continue to encourage patient to attend unit programming.

## 2022-12-29 VITALS
TEMPERATURE: 97.9 F | SYSTOLIC BLOOD PRESSURE: 132 MMHG | HEART RATE: 85 BPM | RESPIRATION RATE: 14 BRPM | BODY MASS INDEX: 31.09 KG/M2 | DIASTOLIC BLOOD PRESSURE: 79 MMHG | WEIGHT: 170 LBS

## 2022-12-29 LAB
ABSOLUTE EOS #: 0.13 K/UL (ref 0–0.4)
ABSOLUTE LYMPH #: 1.38 K/UL (ref 1–4.8)
ABSOLUTE MONO #: 0.43 K/UL (ref 0.1–1.3)
ABSOLUTE RETIC #: 0.17 M/UL (ref 0.02–0.1)
ALBUMIN SERPL-MCNC: 4 G/DL (ref 3.5–5.2)
ALP BLD-CCNC: 88 U/L (ref 35–104)
ALT SERPL-CCNC: 32 U/L (ref 5–33)
ANION GAP SERPL CALCULATED.3IONS-SCNC: 8 MMOL/L (ref 9–17)
AST SERPL-CCNC: 25 U/L
BASOPHILS # BLD: 0 % (ref 0–2)
BASOPHILS ABSOLUTE: 0 K/UL (ref 0–0.2)
BILIRUB SERPL-MCNC: 0.6 MG/DL (ref 0.3–1.2)
BUN BLDV-MCNC: 12 MG/DL (ref 6–20)
CALCIUM SERPL-MCNC: 9.1 MG/DL (ref 8.6–10.4)
CHLORIDE BLD-SCNC: 104 MMOL/L (ref 98–107)
CHOLESTEROL/HDL RATIO: 2.6
CHOLESTEROL: 233 MG/DL
CO2: 26 MMOL/L (ref 20–31)
CREAT SERPL-MCNC: 0.67 MG/DL (ref 0.5–0.9)
EOSINOPHILS RELATIVE PERCENT: 3 % (ref 0–4)
ESTIMATED AVERAGE GLUCOSE: 114 MG/DL
GFR SERPL CREATININE-BSD FRML MDRD: >60 ML/MIN/1.73M2
GLUCOSE BLD-MCNC: 96 MG/DL (ref 70–99)
HBA1C MFR BLD: 5.6 % (ref 4–6)
HCT VFR BLD CALC: 30.4 % (ref 36–46)
HDLC SERPL-MCNC: 88 MG/DL
HEMOGLOBIN: 9.6 G/DL (ref 12–16)
LDL CHOLESTEROL: 121 MG/DL (ref 0–130)
LYMPHOCYTES # BLD: 32 % (ref 24–44)
MCH RBC QN AUTO: 19 PG (ref 26–34)
MCHC RBC AUTO-ENTMCNC: 31.4 G/DL (ref 31–37)
MCV RBC AUTO: 60.6 FL (ref 80–100)
MONOCYTES # BLD: 10 % (ref 1–7)
MORPHOLOGY: ABNORMAL
PDW BLD-RTO: 16.4 % (ref 11.5–14.9)
PLATELET # BLD: 145 K/UL (ref 150–450)
PMV BLD AUTO: 8.4 FL (ref 6–12)
POTASSIUM SERPL-SCNC: 4 MMOL/L (ref 3.7–5.3)
RBC # BLD: 5.03 M/UL (ref 4–5.2)
RETIC %: 3.3 % (ref 0.5–2)
SEG NEUTROPHILS: 55 % (ref 36–66)
SEGMENTED NEUTROPHILS ABSOLUTE COUNT: 2.36 K/UL (ref 1.3–9.1)
SODIUM BLD-SCNC: 138 MMOL/L (ref 135–144)
TOTAL PROTEIN: 6.7 G/DL (ref 6.4–8.3)
TRIGL SERPL-MCNC: 121 MG/DL
WBC # BLD: 4.3 K/UL (ref 3.5–11)

## 2022-12-29 PROCEDURE — 85045 AUTOMATED RETICULOCYTE COUNT: CPT

## 2022-12-29 PROCEDURE — 85025 COMPLETE CBC W/AUTO DIFF WBC: CPT

## 2022-12-29 PROCEDURE — 6370000000 HC RX 637 (ALT 250 FOR IP): Performed by: PSYCHIATRY & NEUROLOGY

## 2022-12-29 PROCEDURE — 80061 LIPID PANEL: CPT

## 2022-12-29 PROCEDURE — 36415 COLL VENOUS BLD VENIPUNCTURE: CPT

## 2022-12-29 PROCEDURE — 83036 HEMOGLOBIN GLYCOSYLATED A1C: CPT

## 2022-12-29 PROCEDURE — 80053 COMPREHEN METABOLIC PANEL: CPT

## 2022-12-29 RX ORDER — TRAZODONE HYDROCHLORIDE 50 MG/1
50 TABLET ORAL NIGHTLY PRN
Qty: 30 TABLET | Refills: 0 | Status: SHIPPED | OUTPATIENT
Start: 2022-12-29

## 2022-12-29 RX ORDER — DULOXETIN HYDROCHLORIDE 30 MG/1
30 CAPSULE, DELAYED RELEASE ORAL DAILY
Status: DISCONTINUED | OUTPATIENT
Start: 2022-12-29 | End: 2022-12-29 | Stop reason: HOSPADM

## 2022-12-29 RX ORDER — CHLORPROMAZINE HYDROCHLORIDE 25 MG/ML
INJECTION INTRAMUSCULAR
Status: DISCONTINUED
Start: 2022-12-29 | End: 2022-12-29 | Stop reason: HOSPADM

## 2022-12-29 RX ORDER — DULOXETIN HYDROCHLORIDE 30 MG/1
30 CAPSULE, DELAYED RELEASE ORAL DAILY
Qty: 30 CAPSULE | Refills: 3 | Status: SHIPPED | OUTPATIENT
Start: 2022-12-29

## 2022-12-29 RX ORDER — HYDROXYZINE 50 MG/1
50 TABLET, FILM COATED ORAL 3 TIMES DAILY PRN
Qty: 30 TABLET | Refills: 0 | Status: SHIPPED | OUTPATIENT
Start: 2022-12-29 | End: 2023-01-08

## 2022-12-29 RX ADMIN — DULOXETINE HYDROCHLORIDE 30 MG: 30 CAPSULE, DELAYED RELEASE ORAL at 09:37

## 2022-12-29 ASSESSMENT — PAIN SCALES - WONG BAKER
WONGBAKER_NUMERICALRESPONSE: 6
WONGBAKER_NUMERICALRESPONSE: 0

## 2022-12-29 ASSESSMENT — PAIN DESCRIPTION - DESCRIPTORS: DESCRIPTORS: CRAMPING

## 2022-12-29 ASSESSMENT — PAIN DESCRIPTION - ORIENTATION: ORIENTATION: LEFT

## 2022-12-29 ASSESSMENT — PAIN DESCRIPTION - LOCATION: LOCATION: LEG

## 2022-12-29 NOTE — DISCHARGE INSTRUCTIONS
Information:  Medications:   Medication summary provided   I understand that I should take only the medications on my list.     -why and when I need to take each medicine.     -which side effects to watch for.     -that I should carry my medication information at all times in case of     Emergency situations. I will take all of my medicines to follow up appointments.     -check with my physician or pharmacist before taking any new    Medication, over the counter product or drink alcohol.    -Ask about food, drug or dietary supplement interactions.    -discard old lists and update records with medication providers. Notify Physician:  Notify physician if you notice:   Always call 911 if you feel your life is in danger  In case of an emergency call 911 immediately! If 911 is not available call your local emergency medical system for help    Behavioral Health Follow Up:  Original Referral Source: [facility]  Discharge Diagnosis: Depression with suicidal ideation [F32. A, R45.851]  Recommendations for Level of Care: Resume Activity as Tolerated. Patient status at discharge: Stable   My hospital  was: [facility]  Aftercare plan faxed: Yes   -faxed by: Staff   -date: 12-29-22   -time: 0919  Prescriptions: Medications filled at [facility] in [facility]  Smoking: Quit Smoking. Call the [employer]'s smoking quitline at 9-235-45D-QUIT  Know the signs of a heart attack   If you have any of the following symptoms call 911 immediately, do not wait more    Than five minutes. 1. Pressure, fullness and/ or squeezing in the center of the chest spreading to    The jaw, neck or shoulder. 2. Chest discomfort with light headedness, fainting, sweating, nausea or    Shortness of breath. 3. Upper abdominal pressure or discomfort. 4. Lower chest pain, back pain, unusual fatigue, shortness of breath, nausea   Or dizziness.      General Information:   Questions regarding your bill: Call HELP program [phone]     Suicide Hotline ([place] [address])  (211) [phone]      Recovery Help line- 343.466.5730      To obtain results of pending studies call Medical Records at: [phone]     For emergencies and 24 hour/7 days a week contact information:  [phone]

## 2022-12-29 NOTE — BH NOTE
[facility]  Discharge Note    Patient discharged home to private residence via [address] cab. Pt discharged with followings belongings:   Dental Appliances: None  Vision - Corrective Lenses: None  Hearing Aid: None  Jewelry: None  Body Piercings Removed: N/A  Clothing: Footwear, Pants, Jacket/Coat  Other Valuables: Wallet, Purse, Keys, Other (Comment) (license Dae, [employer] card, $283.00)   Valuables sent home with patient. Patient educated on aftercare instructions: Yes, and verbalized understanding. Information faxed to [address] [facility] by staff at 12:24 PM .Patient verbalize understanding of AVS:  Yes. Status EXAM upon discharge:  Mental Status and Behavioral Exam  Normal: No  Level of Assistance: Independent/Self  Facial Expression: Avoids Gaze  Affect: Blunt  Level of Consciousness: Alert  Frequency of Checks: 4 times per hour, close  Mood:Normal: No  Mood: Anxious, Angry, Irritable  Motor Activity:Normal: Yes  Eye Contact: Fair  Observed Behavior: Guarded  Sexual Misconduct History: Current - no  Preception: [place] to person, [place] to time, [place] to situation, [place] to place  Attention:Normal: No  Attention: Unable to concentrate  Thought Processes: Circumstantial  Thought Content:Normal: No  Thought Content: Paranoia  Depression Symptoms: Increased irritability, Isolative  Anxiety Symptoms: Generalized  Jeri Symptoms: No problems reported or observed.   Hallucinations: None  Delusions: No  Memory:Normal: No  Memory: Poor recent, Poor remote  Insight and Judgment: No  Insight and Judgment: Poor judgment, Poor insight    Tobacco Screening:  Practical Counseling, on admission, sushil X, if applicable and completed (first 3 are required if patient doesn't refuse):            ( ) Recognizing danger situations (included triggers and roadblocks)                    ( ) Coping skills (new ways to manage stress,relaxation techniques, changing routine, distraction) ( ) Basic information about quitting (benefits of quitting, techniques in how to quit, available resources  ( ) Referral for counseling faxed to [facility]                                                                                                                   ( ) Patient refused counseling  ( ) Patient refused referral  ( ) Patient refused prescription upon discharge  (x) Patient has not smoked in the last 30 days    Metabolic Screening:    No results found for: LABA1C    Lab Results   Component Value Date    CHOL 233 (H) 12/29/2022     Lab Results   Component Value Date    TRIG 121 12/29/2022     Lab Results   Component Value Date    HDL 88 12/29/2022     No components found for: LDLCAL  No results found for: Pricila Otto LPN

## 2022-12-29 NOTE — DISCHARGE SUMMARY
Provider Discharge Summary     Patient ID:  Gillis Apgar  [id number]  23 y.o.  1970    Admit date: 2022    Discharge date and time: 2022  1:13 PM     Admitting Physician: Flavia Roberts MD     Discharge Physician: Meera Lara MD    Admission Diagnoses: Depression with suicidal ideation [F32. A, R45.851]    Discharge Diagnoses:      Major depressive disorder, single episode, severe without psychosis ([facility] [address]     Patient Active Problem List   Diagnosis Code    Clostridium difficile colitis A04.72    Hypovolemic shock ([facility] [address] R57.1    Acute cystitis without hematuria N30.00    Essential hypertension I10    SANTANA (acute kidney injury) ([facility] [address] N17.9    Sickle cell trait ([facility] [address] D57.3    Depression with suicidal ideation F32. A, R45.851    Major depressive disorder, single episode, severe without psychosis ([facility] [address] F32.2    Class 1 obesity due to excess calories with serious comorbidity and body mass index (BMI) of 31.0 to 31.9 in adult E66.09, Z68.31    Anemia of chronic disease D63.8    Hyperglycemia R73.9        Admission Condition: poor    Discharged Condition: stable    Indication for Admission: threat to self    History of Present Illnes (present tense wording is of findings from admission exam and are not necessarily indicative of current findings):   Gillis Apgar is a 46 y.o. female who has a past medical history of sickle cell. Patient presented to the [facility] ED via emergency admission due to overdose on xanax. Per emergency admission, \"patient admits to intentional Xanax overdose in a suicide attempt. Patient at very high risk for repeat attempt if patient goes home. Medically cleared. \"     Per EMR documentation, \"Patient arrives to the ER with the complaint of a Xanax overdose in order to \"go to sleep\". Patient states she was at an inpatient psychiatric unit at [facility] for almost 3 months.    The EAP for patient's employer, [employer], was able to get admission there for patient through her [employer] coverage. [place] states she has a follow-up psychiatric appointment tomorrow but the paperwork is in her car so she cannot say with whom or where. Patient says that she \"bought the pills from someone today\" and kept taking more in order to sleep and not have to think about anything. Patient says that she has still not gotten over the death of her dad 10 years ago and has been struggling with depression since. She had made a comment to staff that she \"wanted to be with her dad\" but denies SI/HI to SW. Patient tells SW that her boyfriend was to come see her last night and never came. When patient went to find him he was with another woman and patient states they \"had words\". Patient states this is what caused her to want to take the pills today. Patient denies a history of suicide attempts or current drug/alcohol use/abuse. Patient states she is prescribed Risperdal, Wellbutrin, Doxepin, and Atarax which she takes as prescribed. SW discussed options with patient and she is agreeable to an inpatient admission but states she \"will not go to [facility] or [facility]'s inpatient units\". Patient says she would like to go to either [facility] or [address] [place] [address]. SW explained that [facility] only admits from their ER. Patient should be medically clear between 9:30-11:30pm per Poison Control. Await lab results and med clearance. Janusz Cee. FLOYD Moralez\"      Patient is agreeable to intake assessment to sensory room. On assessment patient is guarded, suspicious, withdrawn, preoccupied and minimizes symptoms. She provides poor insight to own mental illness and behaviors that resulted to hospitalization. Patient states \"I was on the phone with a friend of mine who lost his mom 1 year ago and I lost my dad 8 years ago and friend called the  because I said I should take 10 Xanax\". Patient denies taking Xanax with intent to harm self and denies ever having any suicidal ideations. Also patient states \"I do not even have any Xanax\" but does clarify being prescribed Xanax in the past.  When asked to clarify in regards to statement wanting to be with that she stated \"I just want to talked with my dad because I was not there when he \". Patient has guarded, somewhat agitated and is focused on being discharged and states not wanting to be here because she has no issues. Patient provides poor insight to prior inpatient hospitalization and states being hospitalized at [facility] from 2022 to 2022 for counseling which she reports receiving \"EMDR\". She denies any prior suicide attempts or any other prior inpatient hospitalizations. Patient reports being prescribed Risperdal, Wellbutrin, doxepin and Atarax at [facility] however states Dr. Chris Bonds from [facility] stopped medication on 2022 due to patient experiencing weight loss from meds. Patient reports being linked with [facility] however she states missing her first follow-up appointment on 2022. When asked reason for hospitalization at [facility] she states due to however stress, grieving issues with that passing however currently minimizes. She states struggling with low mood and stress during the holidays however currently denies because \"the holidays are over\". Denies majority of depressive symptoms. She does states sleeping 3 to 4 hours at night which she states is an issue. Patient reports working at [employer] 3 AM to 3:30 PM shift, 6 to 7 days a week for the past 20 years. She reports no change issues and states weight fluctuating. Nursing staff reports the patient is somewhat agitated, focused on discharge and refusing to meals and fluids. Patient is helpless, hopeless however, denies low mood, lack of energy, concentration, psychomotor slowing or suicidal thoughts. Patient denies any homicidal ideation.   Patient denies any auditory visual hallucinations but does state having dreams about that for the past 2 to 3 years which she states having once every 3 to 4 months. Patient denies any fear of people watching her, talking about her or receiving any messages from media. She is thought blocking however no delusional statements or disorganized speech is noted during assessment. Patient denies any issues with anxiety or panic attacks. She denies any issues phobias, borderline personality disorder or body dysmorphic/eating disorders. Patient reports prior issues with excessive cleansing in the past when \"daughter was diagnosed with chronic renal failure\" due to worried about cleanliness because of daughter's illness however currently denies any obsessive-compulsive tendencies. Patient states being raised by both parents in a stable household and grandparents help raise them. She reports having 1 sister and states not being close with any family due to them not living nearby. Patient reports having 4 adult children and 7 grandchildren live in the [facility]. Also she voices currently legally  but lives alone at private residence with poor family support. Denies any physical or sexual trauma as a child or adult however, she does report finding dad's passing as a traumatic event. She states that past 10 years ago and has left a level question answers for her due to not being there when he passed. Patient voices having dreams as a result to dad's loss which she states have been having for the past 2 to 3 years. Patient denies history of violence or aggression. She reports being arrested in the 90s for not being agitated, denies any recent legal issues. Patient denies issues with alcohol, nicotine, and reported drug use. She endorses utilizing \"THC cream bought from dispensary for chronic leg pain to both legs\". When informed of positive urine drug screen for benzodiazepine and oxycodone patient continues to deny drug use. She states \"I do not do any drugs for Tylenol\" but does clarify that she was positive for the same thing at [facility] clinic. When asked why prescribed naltrexone patient states \"[facility] Dr. Wright Letty me to help me prevents from taking Percocets\". Blood alcohol level negative, urine drug screen positive for benzo diazepam and oxycodone on admission. PDMP reviewed: Naltrexone 50 mg 7 quantity, 7 days supply filled on 12/9/2022. Oxycodone-acetaminophen 5/325 milligrams 20 quantity, 5 days supply filled on 7/6/2022. Patient warrants further inpatient hospitalization due to instability symptoms and safety concern. Hospital Course:   Upon admission, Coleman Keys was provided a safe secure environment, introduced to unit milieu. Patient participated in groups and individual therapies. Meds were adjusted as noted below. After few days of hospital care, patient began to feel improvement. Depression lifted, thoughts to harm self ceased. Sleep improved, appetite was good. On morning rounds 12/29/2022, Coleman Keys  endorses feeling ready for discharge. Patient denies suicidal or homicidal ideations, denies hallucinations or delusions. Denies SE's from meds. It was decided that maximum benefit from hospital care had been achieved and patient can be discharged. Consults:   Internal medicine for medical management    Significant Diagnostic Studies: Routine labs and diagnostics    Treatments: Psychotropic medications, therapy with group, milieu, and 1:1 with nurses, social workers, PAMarcellC/CNP, and Attending physician.       Discharge Medications:  Discharge Medication List as of 12/29/2022 12:30 PM        START taking these medications    Details   DULoxetine (CYMBALTA) 30 MG extended release capsule Take 1 capsule by mouth daily, Disp-30 capsule, R-3Normal      hydrOXYzine HCl (ATARAX) 50 MG tablet Take 1 tablet by mouth 3 times daily as needed for Anxiety, Disp-30 tablet, R-0Normal      traZODone (DESYREL) 50 MG tablet Take 1 tablet by mouth nightly as needed for Sleep, Disp-30 tablet, R-0Normal           CONTINUE these medications which have NOT CHANGED    Details   omeprazole (PRILOSEC) 20 MG delayed release capsule Take 1 capsule by mouth 2 times daily, Disp-60 capsule, R-0Print      chlorzoxazone (PARAFON FORTE) 750 MG TABS tablet Take 750 mg by mouth 3 times daily Historical Med      calcium-cholecalciferol (CALCIUM 500+D) 500-200 MG-UNIT per tablet Take 1 tablet by mouth dailyHistorical Med      Lidocaine (ZTLIDO) 1.8 % PTCH Apply 1 patch topically daily To legHistorical Med      progesterone (PROMETRIUM) 100 MG capsule Take 100 mg by mouth dailyHistorical Med      estrogens, conjugated, (PREMARIN) 0.3 MG tablet Take 0.3 mg by mouth dailyHistorical Med      Cyanocobalamin (VITAMIN B12 PO) Take 1 tablet by mouth dailyHistorical Med      FOLIC ACID PO Take 1 tablet by mouth dailyHistorical Med      loratadine (CLARITIN) 10 MG tablet Take 1 tablet by mouth daily as needed Historical Med           STOP taking these medications       lisinopril (PRINIVIL;ZESTRIL) 5 MG tablet Comments:   Reason for Stopping:         oxyCODONE-acetaminophen (PERCOCET)  MG per tablet Comments:   Reason for Stopping:                Core Measures statement:   Not applicable    Discharge Exam:  Level of consciousness:  Within normal limits  Appearance: Street clothes, seated, with good grooming  Behavior/Motor: No abnormalities noted  Attitude toward examiner:  Cooperative, attentive, good eye contact  Speech:  spontaneous, normal rate, normal volume and well articulated  Mood:  euthymic  Affect:  Full range  Thought processes:  linear, goal directed and coherent  Thought content:  denies homicidal ideation  Suicidal Ideation:  denies suicidal ideation  Delusions:  no evidence of delusions  Perceptual Disturbance:  denies any perceptual disturbance  Cognition:  Intact  Memory: age appropriate  Insight & Judgement: fair  Medication side effects: denies     Disposition: home    Patient Instructions: Activity: activity as tolerated  1. Patient instructed to take medications regularly and follow up with outpatient appointments. Follow-up as scheduled with outpatient [facility]      Signed:    Electronically signed by Michael Gar MD on 12/29/22 at 1:13 PM EST    Time Spent on discharge is more than 30 minutes in the examination, evaluation, counseling and review of medications and discharge plan.

## 2022-12-29 NOTE — PROGRESS NOTES
CLINICAL PHARMACY NOTE: MEDS TO BEDS    Total # of Prescriptions Filled: 3   The following medications were delivered to the patient:  Hydroxyzine HCL 50mg  Trazodone HCL 50mg  Duloxetine HCL 30mg    Additional Documentation:  Delivered Medication to [address] [place] [address]

## 2022-12-29 NOTE — BH NOTE
Emergency PRN Medication Administration Note:      Patient is experiencing increased agitation symptoms as evidenced by patient experiencing \"feelings that she is going to go off on unit. \" Staff attempted to find and relieve the distress by talking to patient and refocusing on new activity. Patient requested PRN medication for agitation. Medication Administered as prescribed: Ativan 2 mg medication by PO route. Patient tolerated medication administration. Will continue to monitor, offer support, and reassess.

## 2022-12-29 NOTE — PLAN OF CARE
Problem: Risk for Elopement  Goal: Patient will not exit the unit/facility without proper excort  12/29/2022 0123 by Magdiel Campos RN  Outcome: Progressing  12/28/2022 1510 by Bertram Saldivar RN  Outcome: Progressing     Problem: ABCDS Injury Assessment  Goal: Absence of physical injury  12/29/2022 0123 by Magdiel Campos RN  Outcome: Progressing  12/28/2022 1510 by Bertram Saldivar RN  Outcome: Progressing     Problem: Behavior  Goal: Pt/Family maintain appropriate behavior and adhere to behavioral management agreement, if implemented  Description: INTERVENTIONS:  1. Assess patient/family's coping skills and  non-compliant behavior (including use of illegal substances)  2. Notify security of behavior or suspected illegal substances which indicate the need for search of the family and/or belongings  3. Encourage verbalization of thoughts and concerns in a socially appropriate manner  4. Utilize positive, consistent limit setting strategies supporting safety of patient, staff and others  5. Encourage participation in the decision making process about the behavioral management agreement  6. If a visitor's behavior poses a threat to safety call refer to organization policy. 7. Initiate consult with , Psychosocial CNS, Spiritual Care as appropriate  12/29/2022 0123 by Magdiel Campos RN  Outcome: Progressing  12/28/2022 1510 by Bertram Saldivar RN  Outcome: Progressing     Problem: Anxiety  Goal: Will report anxiety at manageable levels  Description: INTERVENTIONS:  1. Administer medication as ordered  2. Teach and rehearse alternative coping skills  3.  Provide emotional support with 1:1 interaction with staff  12/29/2022 0123 by Magdiel Campos RN  Outcome: Progressing  12/28/2022 1510 by Bertram Saldivar RN  Outcome: Progressing     Problem: Involuntary Admit  Goal: Will cooperate with staff recommendations and doctor's orders and will demonstrate appropriate behavior  Description: INTERVENTIONS:  1. Treat underlying conditions and offer medication as ordered  2. Educate regarding involuntary admission procedures and rules  3.  Contain excessive/inappropriate behavior per unit and hospital policies  [id number] by Pearletha Gilford, RN  Outcome: Progressing  12/28/2022 1510 by Gabriella Hernandez RN  Outcome: Progressing     Problem: Pain  Goal: Verbalizes/displays adequate comfort level or baseline comfort level  12/29/2022 0123 by Pearletha Gilford, RN  Outcome: Progressing  12/28/2022 1510 by Gabriella Hernandez RN  Outcome: Progressing

## 2022-12-29 NOTE — BH NOTE
Emergency Medication Follow-Up Note:    PRN medication of Ativan 2 mg by PO  route was effective as evidenced by patient resting in bed. Patient reported experiencing decreased agitation symptoms. Patient denied medication side effects. Will continue to monitor and provide support as needed.

## 2022-12-31 LAB — SURGICAL PATHOLOGY REPORT: NORMAL

## 2023-01-01 LAB — PATHOLOGIST REVIEW: NORMAL
